# Patient Record
Sex: MALE | Race: BLACK OR AFRICAN AMERICAN | Employment: OTHER | URBAN - METROPOLITAN AREA
[De-identification: names, ages, dates, MRNs, and addresses within clinical notes are randomized per-mention and may not be internally consistent; named-entity substitution may affect disease eponyms.]

---

## 2021-06-04 ENCOUNTER — APPOINTMENT (OUTPATIENT)
Dept: GENERAL RADIOLOGY | Age: 66
End: 2021-06-04
Attending: EMERGENCY MEDICINE
Payer: MEDICARE

## 2021-06-04 ENCOUNTER — HOSPITAL ENCOUNTER (EMERGENCY)
Age: 66
Discharge: HOME | End: 2021-06-04
Attending: EMERGENCY MEDICINE
Payer: MEDICARE

## 2021-06-04 VITALS
RESPIRATION RATE: 14 BRPM | HEART RATE: 77 BPM | WEIGHT: 139.99 LBS | OXYGEN SATURATION: 100 % | BODY MASS INDEX: 21.97 KG/M2 | DIASTOLIC BLOOD PRESSURE: 88 MMHG | HEIGHT: 67 IN | TEMPERATURE: 98.3 F | SYSTOLIC BLOOD PRESSURE: 152 MMHG

## 2021-06-04 DIAGNOSIS — R10.9 ACUTE ABDOMINAL PAIN: Primary | ICD-10-CM

## 2021-06-04 DIAGNOSIS — K86.89 PANCREATIC MASS: ICD-10-CM

## 2021-06-04 DIAGNOSIS — K59.00 CONSTIPATION, UNSPECIFIED CONSTIPATION TYPE: ICD-10-CM

## 2021-06-04 LAB
ALBUMIN SERPL-MCNC: 3.5 G/DL (ref 3.5–5)
ALBUMIN/GLOB SERPL: 0.7 {RATIO} (ref 1.1–2.2)
ALP SERPL-CCNC: 76 U/L (ref 45–117)
ALT SERPL-CCNC: 20 U/L (ref 12–78)
ANION GAP SERPL CALC-SCNC: 2 MMOL/L (ref 5–15)
APPEARANCE UR: CLEAR
AST SERPL-CCNC: 14 U/L (ref 15–37)
BACTERIA URNS QL MICRO: NEGATIVE /HPF
BASOPHILS # BLD: 0 K/UL (ref 0–0.1)
BASOPHILS NFR BLD: 1 % (ref 0–1)
BILIRUB SERPL-MCNC: 0.4 MG/DL (ref 0.2–1)
BILIRUB UR QL: NEGATIVE
BUN SERPL-MCNC: 5 MG/DL (ref 6–20)
BUN/CREAT SERPL: 7 (ref 12–20)
CALCIUM SERPL-MCNC: 9.5 MG/DL (ref 8.5–10.1)
CHLORIDE SERPL-SCNC: 101 MMOL/L (ref 97–108)
CO2 SERPL-SCNC: 28 MMOL/L (ref 21–32)
COLOR UR: ABNORMAL
CREAT SERPL-MCNC: 0.75 MG/DL (ref 0.7–1.3)
DIFFERENTIAL METHOD BLD: NORMAL
EOSINOPHIL # BLD: 0.2 K/UL (ref 0–0.4)
EOSINOPHIL NFR BLD: 3 % (ref 0–7)
EPITH CASTS URNS QL MICRO: ABNORMAL /LPF
ERYTHROCYTE [DISTWIDTH] IN BLOOD BY AUTOMATED COUNT: 13.9 % (ref 11.5–14.5)
GLOBULIN SER CALC-MCNC: 5.1 G/DL (ref 2–4)
GLUCOSE SERPL-MCNC: 107 MG/DL (ref 65–100)
GLUCOSE UR STRIP.AUTO-MCNC: NEGATIVE MG/DL
HCT VFR BLD AUTO: 40.1 % (ref 36.6–50.3)
HGB BLD-MCNC: 13.5 G/DL (ref 12.1–17)
HGB UR QL STRIP: NEGATIVE
HYALINE CASTS URNS QL MICRO: ABNORMAL /LPF (ref 0–5)
IMM GRANULOCYTES # BLD AUTO: 0 K/UL (ref 0–0.04)
IMM GRANULOCYTES NFR BLD AUTO: 0 % (ref 0–0.5)
KETONES UR QL STRIP.AUTO: ABNORMAL MG/DL
LEUKOCYTE ESTERASE UR QL STRIP.AUTO: ABNORMAL
LIPASE SERPL-CCNC: 144 U/L (ref 73–393)
LYMPHOCYTES # BLD: 2.1 K/UL (ref 0.8–3.5)
LYMPHOCYTES NFR BLD: 37 % (ref 12–49)
MCH RBC QN AUTO: 30.4 PG (ref 26–34)
MCHC RBC AUTO-ENTMCNC: 33.7 G/DL (ref 30–36.5)
MCV RBC AUTO: 90.3 FL (ref 80–99)
MONOCYTES # BLD: 0.4 K/UL (ref 0–1)
MONOCYTES NFR BLD: 6 % (ref 5–13)
NEUTS SEG # BLD: 3 K/UL (ref 1.8–8)
NEUTS SEG NFR BLD: 53 % (ref 32–75)
NITRITE UR QL STRIP.AUTO: NEGATIVE
NRBC # BLD: 0 K/UL (ref 0–0.01)
NRBC BLD-RTO: 0 PER 100 WBC
PH UR STRIP: 7 [PH] (ref 5–8)
PLATELET # BLD AUTO: 271 K/UL (ref 150–400)
PMV BLD AUTO: 9.2 FL (ref 8.9–12.9)
POTASSIUM SERPL-SCNC: 3.9 MMOL/L (ref 3.5–5.1)
PROT SERPL-MCNC: 8.6 G/DL (ref 6.4–8.2)
PROT UR STRIP-MCNC: NEGATIVE MG/DL
RBC # BLD AUTO: 4.44 M/UL (ref 4.1–5.7)
RBC #/AREA URNS HPF: ABNORMAL /HPF (ref 0–5)
SODIUM SERPL-SCNC: 131 MMOL/L (ref 136–145)
SP GR UR REFRACTOMETRY: 1.01 (ref 1–1.03)
UA: UC IF INDICATED,UAUC: ABNORMAL
UROBILINOGEN UR QL STRIP.AUTO: 1 EU/DL (ref 0.2–1)
WBC # BLD AUTO: 5.7 K/UL (ref 4.1–11.1)
WBC URNS QL MICRO: ABNORMAL /HPF (ref 0–4)

## 2021-06-04 PROCEDURE — 80053 COMPREHEN METABOLIC PANEL: CPT

## 2021-06-04 PROCEDURE — 83690 ASSAY OF LIPASE: CPT

## 2021-06-04 PROCEDURE — 85025 COMPLETE CBC W/AUTO DIFF WBC: CPT

## 2021-06-04 PROCEDURE — 74019 RADEX ABDOMEN 2 VIEWS: CPT

## 2021-06-04 PROCEDURE — 74011250636 HC RX REV CODE- 250/636: Performed by: EMERGENCY MEDICINE

## 2021-06-04 PROCEDURE — 74011250637 HC RX REV CODE- 250/637: Performed by: EMERGENCY MEDICINE

## 2021-06-04 PROCEDURE — 36415 COLL VENOUS BLD VENIPUNCTURE: CPT

## 2021-06-04 PROCEDURE — 99283 EMERGENCY DEPT VISIT LOW MDM: CPT

## 2021-06-04 PROCEDURE — 96374 THER/PROPH/DIAG INJ IV PUSH: CPT

## 2021-06-04 PROCEDURE — 81001 URINALYSIS AUTO W/SCOPE: CPT

## 2021-06-04 RX ORDER — ONDANSETRON 4 MG/1
4 TABLET, ORALLY DISINTEGRATING ORAL
Qty: 10 TABLET | Refills: 0 | Status: SHIPPED | OUTPATIENT
Start: 2021-06-04 | End: 2021-08-26 | Stop reason: CLARIF

## 2021-06-04 RX ORDER — ONDANSETRON 2 MG/ML
8 INJECTION INTRAMUSCULAR; INTRAVENOUS
Status: COMPLETED | OUTPATIENT
Start: 2021-06-04 | End: 2021-06-04

## 2021-06-04 RX ORDER — HYDROCODONE BITARTRATE AND ACETAMINOPHEN 5; 325 MG/1; MG/1
1 TABLET ORAL
Qty: 10 TABLET | Refills: 0 | Status: SHIPPED | OUTPATIENT
Start: 2021-06-04 | End: 2021-06-07

## 2021-06-04 RX ORDER — DICYCLOMINE HYDROCHLORIDE 20 MG/1
20 TABLET ORAL
Qty: 20 TABLET | Refills: 0 | Status: SHIPPED | OUTPATIENT
Start: 2021-06-04 | End: 2021-08-26 | Stop reason: CLARIF

## 2021-06-04 RX ORDER — POLYETHYLENE GLYCOL 3350 17 G/17G
17 POWDER, FOR SOLUTION ORAL
Qty: 116 G | Refills: 0 | Status: SHIPPED | OUTPATIENT
Start: 2021-06-04 | End: 2021-08-26 | Stop reason: CLARIF

## 2021-06-04 RX ORDER — DICYCLOMINE HYDROCHLORIDE 20 MG/1
20 TABLET ORAL
Status: COMPLETED | OUTPATIENT
Start: 2021-06-04 | End: 2021-06-04

## 2021-06-04 RX ORDER — MAGNESIUM CITRATE
296 SOLUTION, ORAL ORAL
Status: COMPLETED | OUTPATIENT
Start: 2021-06-04 | End: 2021-06-04

## 2021-06-04 RX ADMIN — ONDANSETRON 8 MG: 2 INJECTION INTRAMUSCULAR; INTRAVENOUS at 17:34

## 2021-06-04 RX ADMIN — MAGNESIUM CITRATE 296 ML: 1.75 LIQUID ORAL at 17:35

## 2021-06-04 RX ADMIN — DICYCLOMINE HYDROCHLORIDE 20 MG: 20 TABLET ORAL at 17:34

## 2021-06-04 NOTE — ED NOTES
Ivelisse MORGAN reviewed discharge instructions with the patient. The patient verbalized understanding. All questions and concerns were addressed. The patient declined a wheelchair and is discharged ambulatory in the care of family members with instructions and prescriptions in hand. Pt is alert and oriented x 4. Respirations are clear and unlabored.

## 2021-06-04 NOTE — ED PROVIDER NOTES
EMERGENCY DEPARTMENT HISTORY AND PHYSICAL EXAM      Date: 6/4/2021  Patient Name: Arnold Canas    History of Presenting Illness     Chief Complaint   Patient presents with    Abdominal Pain     seen recently at [facility] for same ; told he had cyst on pancreas and small kidney stone; +nausea and he had made hisself vomit. +constipation. still having pain. History Provided By: Patient and Patient's Wife    HPI: Arnold Canas, 60 y.o. male presents to the ED with cc of abdominal pain. Patient symptoms started about 2 weeks ago. He has pain involving the mid abdomen which radiates to the back at times. The pain is currently 6 or 7 out of 10 in severity. He was seen at [address] [place] [address] emergency department 2 days ago and had a CT, that reveals cysts within the pancreas. He was told that it could be cancerous and he was to follow-up with his gastroenterologist.  He originally had an appointment set up for next week, but because of insurance issues, that has been changed to 21 June. He was only told to take Tylenol for pain, but that is not helping. He denies fever, chest pain, cough or shortness of breath. He denies any change in bowel habits or dysuria. he has nausea intermittently but only vomited when he tried to make himself feel better. His last bowel movement was 2 days ago, and normally he goes every day. He tried to take an laxative at home which did not help. There are no other complaints, changes, or physical findings at this time. PCP: Tito Pollock MD    No current facility-administered medications on file prior to encounter. No current outpatient medications on file prior to encounter. Past History     Past Medical History:  History reviewed. No pertinent past medical history. Past Surgical History:  History reviewed. No pertinent surgical history. Family History:  History reviewed. No pertinent family history.     Social History:  Social History Tobacco Use    Smoking status: Not on file   Substance Use Topics    Alcohol use: Not on file    Drug use: Not on file       Allergies:  No Known Allergies      Review of Systems   Review of Systems   Constitutional: Negative for fever. HENT: Negative for congestion. Eyes: Negative. Respiratory: Negative for shortness of breath. Cardiovascular: Negative for chest pain. Gastrointestinal: Positive for abdominal pain and nausea. Endocrine: Negative for heat intolerance. Genitourinary: Negative. Musculoskeletal: Negative for back pain. Skin: Negative for rash. Allergic/Immunologic: Negative for immunocompromised state. Neurological: Negative for dizziness. Hematological: Does not bruise/bleed easily. Psychiatric/Behavioral: Negative. All other systems reviewed and are negative. Physical Exam   Physical Exam  Vitals and nursing note reviewed. Constitutional:       General: He is not in acute distress. Appearance: He is well-developed. HENT:      Head: Normocephalic and atraumatic. Cardiovascular:      Rate and Rhythm: Normal rate and regular rhythm. Heart sounds: Normal heart sounds. Pulmonary:      Effort: Pulmonary effort is normal.      Breath sounds: Normal breath sounds. Abdominal:      General: Bowel sounds are normal.      Palpations: Abdomen is soft. Tenderness: There is abdominal tenderness in the epigastric area. Musculoskeletal:      Cervical back: Normal range of motion. Skin:     General: Skin is warm and dry. Neurological:      General: No focal deficit present. Mental Status: He is alert and oriented to person, place, and time.       Coordination: Coordination normal.   Psychiatric:         Mood and Affect: Mood normal.         Behavior: Behavior normal.         Diagnostic Study Results     Labs -     Recent Results (from the past 12 hour(s))   CBC WITH AUTOMATED DIFF    Collection Time: 06/04/21  2:24 PM   Result Value Ref Range    WBC 5.7 4.1 - 11.1 K/uL    RBC 4.44 4.10 - 5.70 M/uL    HGB 13.5 12.1 - 17.0 g/dL    HCT 40.1 36.6 - 50.3 %    MCV 90.3 80.0 - 99.0 FL    MCH 30.4 26.0 - 34.0 PG    MCHC 33.7 30.0 - 36.5 g/dL    RDW 13.9 11.5 - 14.5 %    PLATELET 578 072 - 543 K/uL    MPV 9.2 8.9 - 12.9 FL    NRBC 0.0 0  WBC    ABSOLUTE NRBC 0.00 0.00 - 0.01 K/uL    NEUTROPHILS 53 32 - 75 %    LYMPHOCYTES 37 12 - 49 %    MONOCYTES 6 5 - 13 %    EOSINOPHILS 3 0 - 7 %    BASOPHILS 1 0 - 1 %    IMMATURE GRANULOCYTES 0 0.0 - 0.5 %    ABS. NEUTROPHILS 3.0 1.8 - 8.0 K/UL    ABS. LYMPHOCYTES 2.1 0.8 - 3.5 K/UL    ABS. MONOCYTES 0.4 0.0 - 1.0 K/UL    ABS. EOSINOPHILS 0.2 0.0 - 0.4 K/UL    ABS. BASOPHILS 0.0 0.0 - 0.1 K/UL    ABS. IMM. GRANS. 0.0 0.00 - 0.04 K/UL    DF AUTOMATED     METABOLIC PANEL, COMPREHENSIVE    Collection Time: 06/04/21  2:24 PM   Result Value Ref Range    Sodium 131 (L) 136 - 145 mmol/L    Potassium 3.9 3.5 - 5.1 mmol/L    Chloride 101 97 - 108 mmol/L    CO2 28 21 - 32 mmol/L    Anion gap 2 (L) 5 - 15 mmol/L    Glucose 107 (H) 65 - 100 mg/dL    BUN 5 (L) 6 - 20 MG/DL    Creatinine 0.75 0.70 - 1.30 MG/DL    BUN/Creatinine ratio 7 (L) 12 - 20      GFR est AA >60 >60 ml/min/1.73m2    GFR est non-AA >60 >60 ml/min/1.73m2    Calcium 9.5 8.5 - 10.1 MG/DL    Bilirubin, total 0.4 0.2 - 1.0 MG/DL    ALT (SGPT) 20 12 - 78 U/L    AST (SGOT) 14 (L) 15 - 37 U/L    Alk. phosphatase 76 45 - 117 U/L    Protein, total 8.6 (H) 6.4 - 8.2 g/dL    Albumin 3.5 3.5 - 5.0 g/dL    Globulin 5.1 (H) 2.0 - 4.0 g/dL    A-G Ratio 0.7 (L) 1.1 - 2.2     LIPASE    Collection Time: 06/04/21  2:24 PM   Result Value Ref Range    Lipase 144 73 - 393 U/L       Radiologic Studies -   XR ABD FLAT/ ERECT   Final Result   No acute findings. CT Results  (Last 48 hours)    None        CXR Results  (Last 48 hours)    None          Medical Decision Making   I am the first provider for this patient.     I reviewed the vital signs, available nursing notes, past medical history, past surgical history, family history and social history. Vital Signs-Reviewed the patient's vital signs. Patient Vitals for the past 12 hrs:   Temp Pulse Resp BP SpO2   06/04/21 1319 98.3 °F (36.8 °C) 77 14 (!) 152/88 100 %         Records Reviewed: Nursing Notes, Old Medical Records, Previous Radiology Studies and Previous Laboratory Studies    Provider Notes (Medical Decision Making):    mass, constipation    ED Course:   Initial assessment performed. The patients presenting problems have been discussed, and they are in agreement with the care plan formulated and outlined with them. I have encouraged them to ask questions as they arise throughout their visit. Progress note:    Patient's results were reviewed. Patient is feeling better. He is advised to follow-up and return to ER if worse               Critical Care Time:     0    Disposition:  home    DISCHARGE PLAN:  1. Discharge Medication List as of 6/4/2021  6:01 PM      START taking these medications    Details   dicyclomine (BENTYL) 20 mg tablet Take 1 Tablet by mouth every six (6) hours as needed for Abdominal Cramps., Normal, Disp-20 Tablet, R-0      ondansetron (Zofran ODT) 4 mg disintegrating tablet Take 1 Tablet by mouth every eight (8) hours as needed for Nausea., Normal, Disp-10 Tablet, R-0      polyethylene glycol (Miralax) 17 gram/dose powder Take 17 g by mouth daily as needed for Constipation. 1 tablespoon with 8 oz of water daily, Normal, Disp-116 g, R-0      HYDROcodone-acetaminophen (Norco) 5-325 mg per tablet Take 1 Tablet by mouth every six (6) hours as needed for Pain for up to 3 days. Max Daily Amount: 4 Tablets., Normal, Disp-10 Tablet, R-0           2.    Follow-up Information     Follow up With Specialties Details Why Contact Info    Heidy Canas MD Family Medicine  As needed [address]  [address] [facility] [phone]          Keep your appointment with gastroenterology    [facility] EMERGENCY DEPT Emergency Medicine  If symptoms worsen [address]  [address] [facility]  [phone]        3. Return to ED if worse     Diagnosis     Clinical Impression:   1. Acute abdominal pain    2. Pancreatic mass    3. Constipation, unspecified constipation type        Attestations:    Mai Cao MD    Please note that this dictation was completed with [employer], the computer voice recognition software. Quite often unanticipated grammatical, syntax, homophones, and other interpretive errors are inadvertently transcribed by the computer software. Please disregard these errors. Please excuse any errors that have escaped final proofreading. Thank you.

## 2021-07-27 ENCOUNTER — TRANSCRIBE ORDER (OUTPATIENT)
Dept: SCHEDULING | Age: 66
End: 2021-07-27

## 2021-07-27 DIAGNOSIS — Z12.11 SCREEN FOR COLON CANCER: ICD-10-CM

## 2021-07-27 DIAGNOSIS — K86.89 PANCREATIC MASS: Primary | ICD-10-CM

## 2021-08-08 ENCOUNTER — HOSPITAL ENCOUNTER (OUTPATIENT)
Dept: MRI IMAGING | Age: 66
Discharge: HOME | End: 2021-08-08
Attending: NURSE PRACTITIONER
Payer: MEDICARE

## 2021-08-08 DIAGNOSIS — K86.89 PANCREATIC MASS: ICD-10-CM

## 2021-08-08 DIAGNOSIS — Z12.11 SCREEN FOR COLON CANCER: ICD-10-CM

## 2021-08-08 PROCEDURE — A9585 GADOBUTROL INJECTION: HCPCS | Performed by: NURSE PRACTITIONER

## 2021-08-08 PROCEDURE — 74011250636 HC RX REV CODE- 250/636: Performed by: NURSE PRACTITIONER

## 2021-08-08 PROCEDURE — 74183 MRI ABD W/O CNTR FLWD CNTR: CPT

## 2021-08-08 RX ADMIN — GADOBUTROL 6 ML: 604.72 INJECTION INTRAVENOUS at 16:11

## 2021-08-26 RX ORDER — MELATONIN
2000 DAILY
COMMUNITY

## 2021-08-27 ENCOUNTER — HOSPITAL ENCOUNTER (OUTPATIENT)
Age: 66
Setting detail: OUTPATIENT SURGERY
Discharge: HOME | End: 2021-08-27
Attending: INTERNAL MEDICINE | Admitting: INTERNAL MEDICINE
Payer: MEDICARE

## 2021-08-27 ENCOUNTER — ANESTHESIA (OUTPATIENT)
Dept: ENDOSCOPY | Age: 66
End: 2021-08-27
Payer: MEDICARE

## 2021-08-27 ENCOUNTER — ANESTHESIA EVENT (OUTPATIENT)
Dept: ENDOSCOPY | Age: 66
End: 2021-08-27
Payer: MEDICARE

## 2021-08-27 VITALS
RESPIRATION RATE: 36 BRPM | WEIGHT: 142.8 LBS | TEMPERATURE: 97.3 F | HEIGHT: 67 IN | BODY MASS INDEX: 22.41 KG/M2 | SYSTOLIC BLOOD PRESSURE: 140 MMHG | HEART RATE: 51 BPM | DIASTOLIC BLOOD PRESSURE: 71 MMHG | OXYGEN SATURATION: 98 %

## 2021-08-27 LAB
COVID-19 RAPID TEST, COVR: NOT DETECTED
SARS-COV-2, COV2: NORMAL
SOURCE, COVRS: NORMAL

## 2021-08-27 PROCEDURE — 77030019988 HC FCPS ENDOSC DISP BSC -B: Performed by: INTERNAL MEDICINE

## 2021-08-27 PROCEDURE — 74011250636 HC RX REV CODE- 250/636: Performed by: NURSE ANESTHETIST, CERTIFIED REGISTERED

## 2021-08-27 PROCEDURE — 74011000250 HC RX REV CODE- 250: Performed by: NURSE ANESTHETIST, CERTIFIED REGISTERED

## 2021-08-27 PROCEDURE — 76060000031 HC ANESTHESIA FIRST 0.5 HR: Performed by: INTERNAL MEDICINE

## 2021-08-27 PROCEDURE — 87635 SARS-COV-2 COVID-19 AMP PRB: CPT

## 2021-08-27 PROCEDURE — 74011250636 HC RX REV CODE- 250/636: Performed by: INTERNAL MEDICINE

## 2021-08-27 PROCEDURE — 88305 TISSUE EXAM BY PATHOLOGIST: CPT

## 2021-08-27 PROCEDURE — 76040000019: Performed by: INTERNAL MEDICINE

## 2021-08-27 PROCEDURE — 77030019957 HC CUF BLN GASTSCP OCOA -B: Performed by: INTERNAL MEDICINE

## 2021-08-27 PROCEDURE — 77030003406 HC NDL ASPIR BIOP OCOA -C: Performed by: INTERNAL MEDICINE

## 2021-08-27 PROCEDURE — 2709999900 HC NON-CHARGEABLE SUPPLY: Performed by: INTERNAL MEDICINE

## 2021-08-27 RX ORDER — PROPOFOL 10 MG/ML
INJECTION, EMULSION INTRAVENOUS
Status: DISCONTINUED | OUTPATIENT
Start: 2021-08-27 | End: 2021-08-27 | Stop reason: HOSPADM

## 2021-08-27 RX ORDER — PROPOFOL 10 MG/ML
INJECTION, EMULSION INTRAVENOUS AS NEEDED
Status: DISCONTINUED | OUTPATIENT
Start: 2021-08-27 | End: 2021-08-27 | Stop reason: HOSPADM

## 2021-08-27 RX ORDER — EPINEPHRINE 0.1 MG/ML
1 INJECTION INTRACARDIAC; INTRAVENOUS
Status: DISCONTINUED | OUTPATIENT
Start: 2021-08-27 | End: 2021-08-28 | Stop reason: HOSPADM

## 2021-08-27 RX ORDER — SODIUM CHLORIDE 0.9 % (FLUSH) 0.9 %
5-40 SYRINGE (ML) INJECTION EVERY 8 HOURS
Status: DISCONTINUED | OUTPATIENT
Start: 2021-08-27 | End: 2021-08-30 | Stop reason: HOSPADM

## 2021-08-27 RX ORDER — FLUMAZENIL 0.1 MG/ML
0.2 INJECTION INTRAVENOUS
Status: DISCONTINUED | OUTPATIENT
Start: 2021-08-27 | End: 2021-08-27 | Stop reason: HOSPADM

## 2021-08-27 RX ORDER — DEXTROMETHORPHAN/PSEUDOEPHED 2.5-7.5/.8
1.2 DROPS ORAL
Status: DISCONTINUED | OUTPATIENT
Start: 2021-08-27 | End: 2021-08-30 | Stop reason: HOSPADM

## 2021-08-27 RX ORDER — SODIUM CHLORIDE 0.9 % (FLUSH) 0.9 %
5-40 SYRINGE (ML) INJECTION AS NEEDED
Status: DISCONTINUED | OUTPATIENT
Start: 2021-08-27 | End: 2021-08-30 | Stop reason: HOSPADM

## 2021-08-27 RX ORDER — SODIUM CHLORIDE 9 MG/ML
INJECTION, SOLUTION INTRAVENOUS
Status: DISCONTINUED | OUTPATIENT
Start: 2021-08-27 | End: 2021-08-27 | Stop reason: HOSPADM

## 2021-08-27 RX ORDER — NALOXONE HYDROCHLORIDE 0.4 MG/ML
0.4 INJECTION, SOLUTION INTRAMUSCULAR; INTRAVENOUS; SUBCUTANEOUS
Status: DISCONTINUED | OUTPATIENT
Start: 2021-08-27 | End: 2021-08-27 | Stop reason: HOSPADM

## 2021-08-27 RX ORDER — ATROPINE SULFATE 0.1 MG/ML
0.5 INJECTION INTRAVENOUS
Status: DISCONTINUED | OUTPATIENT
Start: 2021-08-27 | End: 2021-08-28 | Stop reason: HOSPADM

## 2021-08-27 RX ORDER — LEVOFLOXACIN 5 MG/ML
500 INJECTION, SOLUTION INTRAVENOUS ONCE
Status: COMPLETED | OUTPATIENT
Start: 2021-08-27 | End: 2021-08-27

## 2021-08-27 RX ORDER — LIDOCAINE HYDROCHLORIDE 20 MG/ML
INJECTION, SOLUTION EPIDURAL; INFILTRATION; INTRACAUDAL; PERINEURAL AS NEEDED
Status: DISCONTINUED | OUTPATIENT
Start: 2021-08-27 | End: 2021-08-27 | Stop reason: HOSPADM

## 2021-08-27 RX ORDER — SODIUM CHLORIDE 9 MG/ML
75 INJECTION, SOLUTION INTRAVENOUS CONTINUOUS
Status: DISCONTINUED | OUTPATIENT
Start: 2021-08-27 | End: 2021-08-30 | Stop reason: HOSPADM

## 2021-08-27 RX ADMIN — PROPOFOL 50 MG: 10 INJECTION, EMULSION INTRAVENOUS at 10:05

## 2021-08-27 RX ADMIN — PROPOFOL 25 MCG/KG/MIN: 10 INJECTION, EMULSION INTRAVENOUS at 10:00

## 2021-08-27 RX ADMIN — PROPOFOL 50 MG: 10 INJECTION, EMULSION INTRAVENOUS at 10:02

## 2021-08-27 RX ADMIN — PROPOFOL 50 MG: 10 INJECTION, EMULSION INTRAVENOUS at 10:11

## 2021-08-27 RX ADMIN — SODIUM CHLORIDE 75 ML/HR: 9 INJECTION, SOLUTION INTRAVENOUS at 09:13

## 2021-08-27 RX ADMIN — PROPOFOL 50 MG: 10 INJECTION, EMULSION INTRAVENOUS at 10:19

## 2021-08-27 RX ADMIN — LIDOCAINE HYDROCHLORIDE 20 MG: 20 INJECTION, SOLUTION EPIDURAL; INFILTRATION; INTRACAUDAL; PERINEURAL at 10:02

## 2021-08-27 RX ADMIN — LEVOFLOXACIN 500 MG: 500 INJECTION, SOLUTION INTRAVENOUS at 09:13

## 2021-08-27 RX ADMIN — SODIUM CHLORIDE: 900 INJECTION, SOLUTION INTRAVENOUS at 09:58

## 2021-08-27 NOTE — H&P
Gastroenterology Outpatient History and Physical    Patient: Ana Luisa Davies    Physician: Darnell Olvera MD    Chief Complaint: Pancreas cyst  History of Present Illness: No GI complaints    History:  History reviewed. No pertinent past medical history. Past Surgical History:   Procedure Laterality Date    HX ORTHOPAEDIC      ankle      Social History     Socioeconomic History    Marital status:      Spouse name: Not on file    Number of children: Not on file    Years of education: Not on file    Highest education level: Not on file   Tobacco Use    Smoking status: Current Every Day Smoker     Packs/day: 0.25   Substance and Sexual Activity    Alcohol use: Not Currently    Drug use: Not Currently     Social Determinants of Health     Financial Resource Strain:     Difficulty of Paying Living Expenses:    Food Insecurity:     Worried About Running Out of Food in the Last Year:     [address] in the Last Year:    Transportation Needs:     Lack of Transportation (Medical):  Lack of Transportation (Non-Medical):    Physical Activity:     Days of Exercise per Week:     Minutes of Exercise per Session:    Stress:     Feeling of Stress :    Social Connections:     Frequency of Communication with Friends and Family:     Frequency of Social Gatherings with Friends and Family:     Attends Oriental orthodox Services:     Active Member of Clubs or Organizations:     Attends Club or Organization Meetings:     Marital Status:       Family History   Problem Relation Age of Onset    Diabetes Mother     There is no problem list on file for this patient. Allergies: No Known Allergies  Medications:   Prior to Admission medications    Medication Sig Start Date End Date Taking? Authorizing Provider   cholecalciferol (Vitamin D3) (1000 Units /25 mcg) tablet Take 2,000 Units by mouth daily. Yes Provider, Historical   docosahexaenoic acid/epa (FISH OIL PO) Take  by mouth daily.    Yes Provider, Historical     Physical Exam:   Vital Signs: Blood pressure 125/60, pulse 65, temperature 97.2 °F (36.2 °C), resp. rate 20, height 5' 7\" (1.702 m), weight 64.8 kg (142 lb 12.8 oz), SpO2 97 %.   General: well developed, well nourished   HEENT: unremarkable   Heart: regular rhythm no mumur    Lungs: clear   Abdominal:  benign   Neurological: unremarkable   Extremities: no edema     Findings/Diagnosis: Pancreas cyst  Plan of Care/Planned Procedure: EUS with conscious/deep sedation    Signed:  Jeremie Dash MD 8/27/2021

## 2021-08-27 NOTE — ANESTHESIA PREPROCEDURE EVALUATION
Relevant Problems   No relevant active problems       Anesthetic History   No history of anesthetic complications            Review of Systems / Medical History  Patient summary reviewed and pertinent labs reviewed    Pulmonary  Within defined limits                 Neuro/Psych   Within defined limits           Cardiovascular  Within defined limits                Exercise tolerance: >4 METS     GI/Hepatic/Renal  Within defined limits              Endo/Other  Within defined limits           Other Findings              Physical Exam    Airway  Mallampati: II  TM Distance: > 6 cm  Neck ROM: normal range of motion   Mouth opening: Normal     Cardiovascular  Regular rate and rhythm,  S1 and S2 normal,  no murmur, click, rub, or gallop  Rhythm: regular  Rate: normal         Dental    Dentition: Poor dentition  Comments:  Many missing teeth, no loose teeth   Pulmonary  Breath sounds clear to auscultation               Abdominal  GI exam deferred       Other Findings            Anesthetic Plan    ASA: 2  Anesthesia type: general and total IV anesthesia          Induction: Intravenous  Anesthetic plan and risks discussed with: Patient

## 2021-08-27 NOTE — ANESTHESIA POSTPROCEDURE EVALUATION
Procedure(s):  ENDOSCOPIC ULTRASOUND  ESOPHAGOGASTRODUODENOSCOPY (EGD)  ESOPHAGOGASTRODUODENAL (EGD) BIOPSY. general, total IV anesthesia    Anesthesia Post Evaluation      Multimodal analgesia: multimodal analgesia used between 6 hours prior to anesthesia start to PACU discharge  Patient location during evaluation: bedside  Patient participation: complete - patient participated  Level of consciousness: awake  Pain management: adequate  Airway patency: patent  Anesthetic complications: no  Cardiovascular status: acceptable  Respiratory status: acceptable  Hydration status: acceptable  Post anesthesia nausea and vomiting:  controlled  Final Post Anesthesia Temperature Assessment:  Normothermia (36.0-37.5 degrees C)      INITIAL Post-op Vital signs:   Vitals Value Taken Time   /99 08/27/21 1057   Temp 36.3 °C (97.3 °F) 08/27/21 1045   Pulse 53 08/27/21 1059   Resp 16 08/27/21 1059   SpO2 100 % 08/27/21 1059   Vitals shown include unvalidated device data.

## 2021-08-27 NOTE — ROUTINE PROCESS
[facility]  1955  [id number]    Situation:  Verbal report received from: Korin  Procedure: Procedure(s):  ENDOSCOPIC ULTRASOUND  ESOPHAGOGASTRODUODENOSCOPY (EGD)  ESOPHAGOGASTRODUODENAL (EGD) BIOPSY    Background:    Preoperative diagnosis: pancreatic mass  Postoperative diagnosis: Schotzky's Ring, hiatal hernia, gastritis, Chronic pancreatitis, pseudocyst    :  Dr. Mara Cantu  Assistant(s): Endoscopy Technician-1: Marbella Jimenes  Endoscopy RN-1: Liza Alcantar RN    Specimens:   ID Type Source Tests Collected by Time Destination   1 : Gastric bx Preservative Gastric  Jcarlos Street MD 8/27/2021 1013 Pathology     H. Pylori  yes    Assessment:  Intra-procedure medications   Anesthesia gave intra-procedure sedation and medications, see anesthesia flow sheet yes    Intravenous fluids: NS@ KVO     Vital signs stable yes    Abdominal assessment: rouyesnd and soft yes    Recommendation:

## 2021-08-27 NOTE — PROGRESS NOTES
Endoscope was pre-cleaned at the bedside immediately following procedure by [facility]. Anesthesia reports 237mg Propofol, 20mg Lidocaine and 300mL NS given during procedure. Received report from anesthesia staff on vital signs and status of patient.

## 2021-08-27 NOTE — DISCHARGE INSTRUCTIONS
Lesly Lucas  [id number]  1955    EUS DISCHARGE INSTRUCTIONS  Discomfort:  Sore throat- throat lozenges or warm salt water gargle  redness at IV site- apply warm compress to area; if redness or soreness persist- contact your physician  Gaseous discomfort- walking, belching will help relieve any discomfort  You may not operate a vehicle for 12 hours  You may not engage in an occupation involving machinery or appliances for rest of today  You may not drink alcoholic beverages for at least 12 hours  Avoid making any critical decisions for at least 24 hour  DIET  You may resume your regular diet - however -  remember your colon is empty and a heavy meal will produce gas. Avoid these foods:  vegetables, fried / greasy foods, carbonated drinks    MEDICATIONS:  Per Medication Reconciliation      ACTIVITY  You may resume your normal daily activities until tomorrow AM;  Spend the remainder of the day resting -  avoid any strenuous activity. CALL M.D. ANY SIGN OF   Increasing pain, nausea, vomiting  Abdominal distension (swelling)  New increased bleeding (oral or rectal)  Fever (chills)  Pain in chest area  Bloody discharge from nose or mouth  Shortness of breath    You may not take any Advil, Aspirin, Ibuprofen, Motrin, Aleve, or Goodys for 10 days, ONLY  Tylenol as needed for pain. IMPRESSION:  Endoscopic:  1. Normal proximal and mid esophagus  2. Non-obstructing Schatzki's ring  3. Small, sliding hiatal hernia  4. Moderate focal pre-pyloric gastropathy. Biopsied  5. Stomach otherwise normal, including retroflexion  6. Normal duodenal bulb and 2nd portion of the duodenum    Ultrasound:   Pancreas:    1.  Severe diffuse chronic pancreatitis in head, neck, uncinate as evidenced by lobularity, diffusely hypoechoic pancreas, and hyperechoic stranding   2. 2 cm cyst in uncinate as described on MRI with internal debris most consistent with a pseudocyst. This was not sampled due to risk of infection and lack of high risk features      Recommendations:   1. Follow up pathology  2. Strict Etoh/tobacco abstinence (patient should not be even drinking 'non-alcoholic' beer)  3.  CT Pancreatic protocol in 3-6 months      Follow-up Instructions:   Call Dr. Tray Snyder for the results of procedure / biopsy in 7-10 days   Telephone # [phone]    Abel Martinez MD

## 2021-10-15 ENCOUNTER — HOSPITAL ENCOUNTER (OUTPATIENT)
Dept: PREADMISSION TESTING | Age: 66
Discharge: HOME | End: 2021-10-15
Payer: MEDICARE

## 2021-10-15 PROCEDURE — U0005 INFEC AGEN DETEC AMPLI PROBE: HCPCS

## 2021-10-17 LAB
SARS-COV-2, XPLCVT: NOT DETECTED
SOURCE, COVRS: NORMAL

## 2021-10-19 ENCOUNTER — ANESTHESIA (OUTPATIENT)
Dept: ENDOSCOPY | Age: 66
End: 2021-10-19
Payer: MEDICARE

## 2021-10-19 ENCOUNTER — ANESTHESIA EVENT (OUTPATIENT)
Dept: ENDOSCOPY | Age: 66
End: 2021-10-19
Payer: MEDICARE

## 2021-10-19 ENCOUNTER — HOSPITAL ENCOUNTER (OUTPATIENT)
Age: 66
Setting detail: OUTPATIENT SURGERY
Discharge: HOME | End: 2021-10-19
Attending: INTERNAL MEDICINE | Admitting: INTERNAL MEDICINE
Payer: MEDICARE

## 2021-10-19 VITALS
SYSTOLIC BLOOD PRESSURE: 130 MMHG | BODY MASS INDEX: 22.47 KG/M2 | WEIGHT: 139.8 LBS | DIASTOLIC BLOOD PRESSURE: 71 MMHG | RESPIRATION RATE: 19 BRPM | HEIGHT: 66 IN | OXYGEN SATURATION: 97 % | HEART RATE: 76 BPM | TEMPERATURE: 98 F

## 2021-10-19 PROCEDURE — 76040000019: Performed by: INTERNAL MEDICINE

## 2021-10-19 PROCEDURE — 76060000031 HC ANESTHESIA FIRST 0.5 HR: Performed by: INTERNAL MEDICINE

## 2021-10-19 PROCEDURE — 74011250636 HC RX REV CODE- 250/636: Performed by: INTERNAL MEDICINE

## 2021-10-19 PROCEDURE — 2709999900 HC NON-CHARGEABLE SUPPLY: Performed by: INTERNAL MEDICINE

## 2021-10-19 PROCEDURE — 74011000250 HC RX REV CODE- 250: Performed by: NURSE ANESTHETIST, CERTIFIED REGISTERED

## 2021-10-19 PROCEDURE — 74011250636 HC RX REV CODE- 250/636: Performed by: NURSE ANESTHETIST, CERTIFIED REGISTERED

## 2021-10-19 PROCEDURE — 74011250637 HC RX REV CODE- 250/637: Performed by: INTERNAL MEDICINE

## 2021-10-19 RX ORDER — FLUMAZENIL 0.1 MG/ML
0.2 INJECTION INTRAVENOUS
Status: DISCONTINUED | OUTPATIENT
Start: 2021-10-19 | End: 2021-10-19 | Stop reason: HOSPADM

## 2021-10-19 RX ORDER — PROPOFOL 10 MG/ML
INJECTION, EMULSION INTRAVENOUS AS NEEDED
Status: DISCONTINUED | OUTPATIENT
Start: 2021-10-19 | End: 2021-10-19 | Stop reason: HOSPADM

## 2021-10-19 RX ORDER — SODIUM CHLORIDE 9 MG/ML
100 INJECTION, SOLUTION INTRAVENOUS CONTINUOUS
Status: DISCONTINUED | OUTPATIENT
Start: 2021-10-19 | End: 2021-10-19 | Stop reason: HOSPADM

## 2021-10-19 RX ORDER — NALOXONE HYDROCHLORIDE 0.4 MG/ML
0.4 INJECTION, SOLUTION INTRAMUSCULAR; INTRAVENOUS; SUBCUTANEOUS
Status: DISCONTINUED | OUTPATIENT
Start: 2021-10-19 | End: 2021-10-19 | Stop reason: HOSPADM

## 2021-10-19 RX ORDER — MIDAZOLAM HYDROCHLORIDE 1 MG/ML
.25-5 INJECTION, SOLUTION INTRAMUSCULAR; INTRAVENOUS
Status: DISCONTINUED | OUTPATIENT
Start: 2021-10-19 | End: 2021-10-19 | Stop reason: HOSPADM

## 2021-10-19 RX ORDER — EPINEPHRINE 0.1 MG/ML
1 INJECTION INTRACARDIAC; INTRAVENOUS
Status: DISCONTINUED | OUTPATIENT
Start: 2021-10-19 | End: 2021-10-19 | Stop reason: HOSPADM

## 2021-10-19 RX ORDER — SODIUM CHLORIDE 0.9 % (FLUSH) 0.9 %
5-40 SYRINGE (ML) INJECTION EVERY 8 HOURS
Status: DISCONTINUED | OUTPATIENT
Start: 2021-10-19 | End: 2021-10-19 | Stop reason: HOSPADM

## 2021-10-19 RX ORDER — EPHEDRINE SULFATE/0.9% NACL/PF 50 MG/5 ML
SYRINGE (ML) INTRAVENOUS AS NEEDED
Status: DISCONTINUED | OUTPATIENT
Start: 2021-10-19 | End: 2021-10-19 | Stop reason: HOSPADM

## 2021-10-19 RX ORDER — SODIUM CHLORIDE 0.9 % (FLUSH) 0.9 %
5-40 SYRINGE (ML) INJECTION AS NEEDED
Status: DISCONTINUED | OUTPATIENT
Start: 2021-10-19 | End: 2021-10-19 | Stop reason: HOSPADM

## 2021-10-19 RX ORDER — DEXTROMETHORPHAN/PSEUDOEPHED 2.5-7.5/.8
1.2 DROPS ORAL
Status: DISCONTINUED | OUTPATIENT
Start: 2021-10-19 | End: 2021-10-19 | Stop reason: HOSPADM

## 2021-10-19 RX ORDER — ATROPINE SULFATE 0.1 MG/ML
0.5 INJECTION INTRAVENOUS
Status: DISCONTINUED | OUTPATIENT
Start: 2021-10-19 | End: 2021-10-19 | Stop reason: HOSPADM

## 2021-10-19 RX ORDER — LIDOCAINE HYDROCHLORIDE 20 MG/ML
INJECTION, SOLUTION EPIDURAL; INFILTRATION; INTRACAUDAL; PERINEURAL AS NEEDED
Status: DISCONTINUED | OUTPATIENT
Start: 2021-10-19 | End: 2021-10-19 | Stop reason: HOSPADM

## 2021-10-19 RX ADMIN — Medication 80 MG: at 13:47

## 2021-10-19 RX ADMIN — PROPOFOL 20 MG: 10 INJECTION, EMULSION INTRAVENOUS at 13:55

## 2021-10-19 RX ADMIN — PROPOFOL 50 MG: 10 INJECTION, EMULSION INTRAVENOUS at 13:51

## 2021-10-19 RX ADMIN — LIDOCAINE HYDROCHLORIDE 100 MG: 20 INJECTION, SOLUTION EPIDURAL; INFILTRATION; INTRACAUDAL; PERINEURAL at 13:44

## 2021-10-19 RX ADMIN — SODIUM CHLORIDE 100 ML/HR: 9 INJECTION, SOLUTION INTRAVENOUS at 13:40

## 2021-10-19 RX ADMIN — PROPOFOL 70 MG: 10 INJECTION, EMULSION INTRAVENOUS at 13:44

## 2021-10-19 RX ADMIN — Medication 10 MG: at 13:49

## 2021-10-19 RX ADMIN — Medication 10 MG: at 13:54

## 2021-10-19 NOTE — PROCEDURES
[facility]                  Colonoscopy Operative Report    10/19/2021      Bernice Lambert  [id number]  1955    Procedure Type:   Colonoscopy --screening     Indications:    Screening colonoscopy     Pre-operative Diagnosis: see indication above    Post-operative Diagnosis:  See findings below    :  Carmel Frost MD    Referring Provider: Clemon Riedel, MD      Sedation:  MAC anesthesia Propofol    Pre-Procedural Exam:      Airway: clear,  No airway problems anticipated  Heart: RRR, without gallops or rubs  Lungs: clear bilaterally without wheezes, crackles, or rhonchi  Abdomen: soft, nontender, nondistended, bowel sounds present  Mental Status: awake, alert and oriented to person, place and time     Procedure Details:  After informed consent was obtained with all risks and benefits of procedure explained and preoperative exam completed, the patient was taken to the endoscopy suite and placed in the left lateral decubitus position. Upon sequential sedation as per above, a digital rectal exam was performed . The Olympus videocolonoscope  was inserted in the rectum and carefully advanced to the cecum, which was identified by the ileocecal valve and appendiceal orifice. The cecum was identified by the ileocecal valve and appendiceal orifice. The quality of preparation was good. The colonoscope was slowly withdrawn with careful evaluation between folds. Retroflexion in the rectum was completed demonstrating internal hemorrhoids. Findings:   Rectum: Grade 1 internal hemorrhoid(s); Sigmoid:     - Diverticulosis  Descending Colon:     - Diverticulosis  Transverse Colon: normal  Ascending Colon: normal  Cecum: normal  Terminal Ileum: not intubated      Specimen Removed:  none    Complications: None. EBL:  None.     Impression:    diverticulosis,  Moderate in degree, involving the descending colon and the sigmoid  hemorrhoids internal, Moderate in size    Recommendations: --For colon cancer screening in this average-risk patient, colonoscopy may be repeated in 10 years. High fiber diet. Resume normal medication(s). Discharge Disposition:  Home in the company of a  when able to ambulate. Vika Villegas MD    10/19/2021     CHANTELL Lai MD  [facility], [address] [facility] [address] [place] [address]  [phone]  [email or web address]

## 2021-10-19 NOTE — H&P
Jer Parada MD  [facility], [address] [facility]  [phone]  [email or web address]    Gastroenterology Outpatient History and Physical    Patient: Severo Adelitasarah    Physician: Jessica Fong MD    Vital Signs: Blood pressure 139/78, pulse 75, temperature 97.9 °F (36.6 °C), resp. rate 23, height 5' 6\" (1.676 m), weight 63.4 kg (139 lb 12.8 oz), SpO2 100 %. Allergies: No Known Allergies    Chief Complaint: Screening colonoscopy    History of Present Illness: Here for a screening colonoscopy. No prior colonoscopy. Currently has no GI symptoms. No FH of colon cancer or polyps. History:  History reviewed. No pertinent past medical history. Past Surgical History:   Procedure Laterality Date    HX ORTHOPAEDIC Left 2000    ankle - with hardware      Social History     Socioeconomic History    Marital status:      Spouse name: Not on file    Number of children: Not on file    Years of education: Not on file    Highest education level: Not on file   Tobacco Use    Smoking status: Current Every Day Smoker     Packs/day: 0.25    Smokeless tobacco: Never Used   Vaping Use    Vaping Use: Never used   Substance and Sexual Activity    Alcohol use: Not Currently    Drug use: Not Currently     Social Determinants of Health     Financial Resource Strain:     Difficulty of Paying Living Expenses:    Food Insecurity:     Worried About Running Out of Food in the Last Year:     [address] in the Last Year:    Transportation Needs:     Lack of Transportation (Medical):      Lack of Transportation (Non-Medical):    Physical Activity:     Days of Exercise per Week:     Minutes of Exercise per Session:    Stress:     Feeling of Stress :    Social Connections:     Frequency of Communication with Friends and Family:     Frequency of Social Gatherings with Friends and Family:     Attends Jew Services:     Active Member of Clubs or Organizations:     Attends Club or Organization Meetings:     Marital Status:       Family History   Problem Relation Age of Onset    Diabetes Mother     Cancer Father         unknown    There is no problem list on file for this patient. Medications:   Prior to Admission medications    Medication Sig Start Date End Date Taking? Authorizing Provider   cholecalciferol (Vitamin D3) (1000 Units /25 mcg) tablet Take 2,000 Units by mouth daily. Yes Provider, Historical   docosahexaenoic acid/epa (FISH OIL PO) Take  by mouth daily.    Yes Provider, Historical       Physical Exam:     General: well developed, well nourished   HEENT: unremarkable   Heart: regular rhythm no mumur    Lungs: clear   Abdominal:  benign   Neurological: unremarkable   Extremities: no edema     Findings/Diagnosis: Screening colonoscopy    Plan of Care/Planned Procedure: Colonoscopy with monitored anesthesia care sedation    Signed:  Joey Salgado MD 10/19/2021

## 2021-10-19 NOTE — ANESTHESIA PREPROCEDURE EVALUATION
Relevant Problems   No relevant active problems       Anesthetic History   No history of anesthetic complications            Review of Systems / Medical History  Patient summary reviewed, nursing notes reviewed and pertinent labs reviewed    Pulmonary          Smoker      Comments: Current Every Day Smoker   Neuro/Psych   Within defined limits           Cardiovascular  Within defined limits                Exercise tolerance: >4 METS     GI/Hepatic/Renal               Comments: PANCREATIC MASS Endo/Other  Within defined limits           Other Findings   Comments: Hx hyponatremia           Physical Exam    Airway  Mallampati: II  TM Distance: > 6 cm  Neck ROM: normal range of motion   Mouth opening: Normal     Cardiovascular  Regular rate and rhythm,  S1 and S2 normal,  no murmur, click, rub, or gallop             Dental    Dentition: Poor dentition  Comments:  Many missing teeth, some rotten/broken off at the gumline   Pulmonary  Breath sounds clear to auscultation               Abdominal  GI exam deferred       Other Findings            Anesthetic Plan    ASA: 2  Anesthesia type: total IV anesthesia          Induction: Intravenous  Anesthetic plan and risks discussed with: Patient

## 2021-10-19 NOTE — ANESTHESIA POSTPROCEDURE EVALUATION
Procedure(s):  COLONOSCOPY.    total IV anesthesia    Anesthesia Post Evaluation        Patient location during evaluation: PACU  Note status: Adequate. Level of consciousness: responsive to verbal stimuli and sleepy but conscious  Pain management: satisfactory to patient  Airway patency: patent  Anesthetic complications: no  Cardiovascular status: acceptable  Respiratory status: acceptable  Hydration status: acceptable  Comments: +Post-Anesthesia Evaluation and Assessment    Patient: Ailin Garrido MRN: [id number]  SSN: [id number]   YOB: 1955  Age: 73 y.o. Sex: male      Cardiovascular Function/Vital Signs    BP (!) 108/57   Pulse 74   Temp 36.6 °C (97.9 °F)   Resp 17   Ht 5' 6\" (1.676 m)   Wt 63.4 kg (139 lb 12.8 oz)   SpO2 99%   BMI 22.56 kg/m²     Patient is status post Procedure(s):  COLONOSCOPY. Nausea/Vomiting: Controlled. Postoperative hydration reviewed and adequate. Pain:  Pain Scale 1: Numeric (0 - 10) (10/19/21 1333)  Pain Intensity 1: 0 (10/19/21 1333)   Managed. Neurological Status: At baseline. Mental Status and Level of Consciousness: Arousable. Pulmonary Status:   O2 Device: Nasal cannula (10/19/21 1358)   Adequate oxygenation and airway patent. Complications related to anesthesia: None    Post-anesthesia assessment completed. No concerns.     Signed By: Jes Salguero MD    10/19/2021  Post anesthesia nausea and vomiting:  controlled      INITIAL Post-op Vital signs:   Vitals Value Taken Time   /57 10/19/21 1358   Temp     Pulse 74 10/19/21 1358   Resp 17 10/19/21 1358   SpO2 99 % 10/19/21 1358

## 2021-10-19 NOTE — PERIOP NOTES
Endoscope was pre-cleaned at bedside immediately following procedure by DIANE Kelsey     Medications     lidocaine (PF) 2% (mg)     Date/Time   Rate/Dose/Volume Action Route Admin User Audit   10/19/21  1344  100 mg Given IntraVENous Ancil Muta, CRNA              propofol 10 mg/mL (mg)     Date/Time   Rate/Dose/Volume Action Route Admin User Audit   10/19/21  1344  70 mg Given IntraVENous Ancil Muta, CRNA     1351  50 mg Given IntraVENous Ancil Muta, CRNA     1355  20 mg Given IntraVENous Ancil Muta, CRNA              ePHEDrine injection (mg) Dosing weight:  63.4    Date/Time   Rate/Dose/Volume Action Route Admin User Audit   10/19/21  1349  10 mg Given IntraVENous Ancil Muta, CRNA edited      10 mg Given IntraVENous Ancil Muta, CRNA              0.9% sodium chloride infusion (mL/hr)     Date/Time   Rate/Dose/Volume Action Route Admin User Audit   10/19/21  1340  100 mL/hr New Bag IntraVENous Idamae Curet edited    [facility] IntraVENous Ellen Smith MD     1267  623 mL/hr Rate Change IntraVENous Ancil Muta, CRNA

## 2021-10-19 NOTE — ROUTINE PROCESS
[facility]  1955  [id number]    Situation:  Verbal report received from: Kesha Burkett RN  Procedure: Procedure(s):  COLONOSCOPY    Background:    Preoperative diagnosis: PANCREATIC MASS, SCREENING  Postoperative diagnosis: Diverticulosis, Hemorrhoids    :  Dr. Amber Polanco   Assistant(s): Endoscopy Technician-1: Noel Bustillo  Endoscopy RN-1: Siddhartha Heading    Specimens: * No specimens in log *  H. Pylori  no    Assessment:  Intra-procedure medication  Anesthesia gave intra-procedure sedation and medications, see anesthesia flow sheet yes    Intravenous fluids: NS@ KVO     Vital signs stable     Abdominal assessment: round and soft     Recommendation:  Discharge patient per MD order. Family.  Cynda January wife  Permission to share finding with family or friend yes

## 2021-10-19 NOTE — DISCHARGE INSTRUCTIONS
Trish Toney MD  [facility], [address]  [place], [address] [facility]  [phone]  www. [employer] [place]  [id number]  1955    COLON DISCHARGE INSTRUCTIONS  Discomfort:  Redness at IV site- apply warm compress to area; if redness or soreness persist- contact your physician  There may be a slight amount of blood passed from the rectum  Gaseous discomfort- walking, belching will help relieve any discomfort  You may not operate a vehicle for 12 hours  You may not engage in an occupation involving machinery or appliances for rest of today  You may not drink alcoholic beverages for at least 12 hours  Avoid making any critical decisions for at least 24 hour  DIET:   High fiber diet. - however -  remember your colon is empty and a heavy meal will produce gas. Avoid these foods:  vegetables, fried / greasy foods, carbonated drinks for today      ACTIVITY:  You may resume your normal daily activities it is recommended that you spend the remainder of the day resting -  avoid any strenuous activity. CALL M.D. ANY SIGN OF:   Increasing pain, nausea, vomiting  Abdominal distension (swelling)  New increased bleeding (oral or rectal)  Fever (chills)  Pain in chest area  Bloody discharge from nose or mouth  Shortness of breath     COLONOSCOPY FINDINGS:  Your colonoscopy showed: NO polyps found. Do have diverticulosis and some internal hemorrhoids. Follow-up Instructions:   Call Dr. Trish Toney if any questions or problems. Telephone # [phone]    Should have a repeat colonoscopy in 10 years.

## 2022-03-14 ENCOUNTER — OFFICE VISIT (OUTPATIENT)
Dept: FAMILY MEDICINE CLINIC | Age: 67
End: 2022-03-14
Payer: MEDICARE

## 2022-03-14 VITALS
RESPIRATION RATE: 20 BRPM | WEIGHT: 146.6 LBS | BODY MASS INDEX: 23.56 KG/M2 | HEIGHT: 66 IN | OXYGEN SATURATION: 98 % | TEMPERATURE: 97.5 F | HEART RATE: 68 BPM | SYSTOLIC BLOOD PRESSURE: 136 MMHG | DIASTOLIC BLOOD PRESSURE: 78 MMHG

## 2022-03-14 DIAGNOSIS — Z87.891 FORMER SMOKER: ICD-10-CM

## 2022-03-14 DIAGNOSIS — Z13.220 SCREENING FOR LIPID DISORDERS: ICD-10-CM

## 2022-03-14 DIAGNOSIS — Z11.59 SCREENING FOR VIRAL DISEASE: ICD-10-CM

## 2022-03-14 DIAGNOSIS — L05.91 CHRONIC RECURRENT PILONIDAL CYST: ICD-10-CM

## 2022-03-14 DIAGNOSIS — Z12.5 SCREENING FOR MALIGNANT NEOPLASM OF PROSTATE: ICD-10-CM

## 2022-03-14 DIAGNOSIS — I10 ESSENTIAL HYPERTENSION: ICD-10-CM

## 2022-03-14 DIAGNOSIS — Z00.00 MEDICARE WELCOME EXAM: Primary | ICD-10-CM

## 2022-03-14 PROCEDURE — G0402 INITIAL PREVENTIVE EXAM: HCPCS

## 2022-03-14 PROCEDURE — G0403 EKG FOR INITIAL PREVENT EXAM: HCPCS

## 2022-03-14 PROCEDURE — 93005 ELECTROCARDIOGRAM TRACING: CPT

## 2022-03-14 PROCEDURE — 99203 OFFICE O/P NEW LOW 30 MIN: CPT

## 2022-03-14 NOTE — PATIENT INSTRUCTIONS
Medicare Wellness Visit, Male    The best way to live healthy is to have a lifestyle where you eat a well-balanced diet, exercise regularly, limit alcohol use, and quit all forms of tobacco/nicotine, if applicable. Regular preventive services are another way to keep healthy. Preventive services (vaccines, screening tests, monitoring & exams) can help personalize your care plan, which helps you manage your own care. Screening tests can find health problems at the earliest stages, when they are easiest to treat. [facility] follows the current, evidence-based guidelines published by the [facility] ([facility]) when recommending preventive services for our patients. Because we follow these guidelines, sometimes recommendations change over time as research supports it. (For example, a prostate screening blood test is no longer routinely recommended for men with no symptoms). Of course, you and your doctor may decide to screen more often for some diseases, based on your risk and co-morbidities (chronic disease you are already diagnosed with). Preventive services for you include:  - Medicare offers their members a free annual wellness visit, which is time for you and your primary care provider to discuss and plan for your preventive service needs. Take advantage of this benefit every year!  -All adults over age 72 should receive the recommended pneumonia vaccines. Current USPSTF guidelines recommend a series of two vaccines for the best pneumonia protection.   -All adults should have a flu vaccine yearly and tetanus vaccine every 10 years.  -All adults age 48 and older should receive the shingles vaccines (series of two vaccines).        -All adults age 38-68 who are overweight should have a diabetes screening test once every three years.   -Other screening tests & preventive services for persons with diabetes include: an eye exam to screen for diabetic retinopathy, a kidney function test, a foot exam, and stricter control over your cholesterol.   -Cardiovascular screening for adults with routine risk involves an electrocardiogram (ECG) at intervals determined by the provider.   -Colorectal cancer screening should be done for adults age 54-65 with no increased risk factors for colorectal cancer. There are a number of acceptable methods of screening for this type of cancer. Each test has its own benefits and drawbacks. Discuss with your provider what is most appropriate for you during your annual wellness visit. The different tests include: colonoscopy (considered the best screening method), a fecal occult blood test, a fecal DNA test, and sigmoidoscopy.  -All adults born between [facility] should be screened once for Hepatitis C.  -An Abdominal Aortic Aneurysm (AAA) Screening is recommended for men age 73-68 who has ever smoked in their lifetime.      Here is a list of your current Health Maintenance items (your personalized list of preventive services) with a due date:  Health Maintenance Due   Topic Date Due    Hepatitis C Test  Never done    COVID-19 Vaccine (1) Never done    DTaP/Tdap/Td  (1 - Tdap) Never done    Cholesterol Test   Never done    Shingles Vaccine (1 of 2) Never done    Annual Well Visit  Never done    Pneumococcal Vaccine (1 of 1 - PPSV23) Never done    Yearly Flu Vaccine (1) Never done

## 2022-03-14 NOTE — ACP (ADVANCE CARE PLANNING)
Discussed importance of advanced medical directives with patient. Patient is capable of making decisions. Discussed advanced directives 3/14/2022. [place] advance directive form discussed with pt. Pt will consider options and give us written form back for inclusion in chart.

## 2022-03-14 NOTE — PROGRESS NOTES
This is an Initial Medicare Annual Wellness Exam (AWV) (Performed 12 months after IPPE or effective date of Medicare Part B enrollment, Once in a lifetime)    I have reviewed the patient's medical history in detail and updated the computerized patient record. Assessment/Plan   Education and counseling provided:  Are appropriate based on today's review and evaluation    1. Essential hypertension  2. Screening for viral disease  3. Screening for malignant neoplasm of prostate  4. Screening for lipid disorders  5. Current smoker       Depression Risk Factor Screening     3 most recent PHQ Screens 3/14/2022   Little interest or pleasure in doing things Not at all   Feeling down, depressed, irritable, or hopeless Not at all   Total Score PHQ 2 0       Alcohol & Drug Abuse Risk Screen    Do you average more than 1 drink per night or more than 7 drinks a week: No    In the past three months have you have had more than 4 drinks containing alcohol on one occasion: No          Functional Ability and Level of Safety    Hearing: Hearing is good. Activities of Daily Living: The home contains: handrails  Patient does total self care     Ambulation: with no difficulty      Fall Risk:  Fall Risk Assessment, last 12 mths 3/14/2022   Able to walk? Yes   Fall in past 12 months? 0   Do you feel unsteady?  0   Are you worried about falling 1      Abuse Screen:  Patient is not abused       Cognitive Screening    Has your family/caregiver stated any concerns about your memory: no     Cognitive Screening: Normal - Oriented x4    Health Maintenance Due     Health Maintenance Due   Topic Date Due    Hepatitis C Screening  Never done    COVID-19 Vaccine (1) Never done    DTaP/Tdap/Td series (1 - Tdap) Never done    Lipid Screen  Never done    Shingrix Vaccine Age 50> (1 of 2) Never done    Medicare Yearly Exam  Never done    Pneumococcal 65+ years (1 of 1 - PPSV23) Never done    Flu Vaccine (1) Never done       Patient Care Team   Patient Care Team:  Anabell Merlos MD as PCP - General (Family Medicine)    History   There is no problem list on file for this patient. History reviewed. No pertinent past medical history. Past Surgical History:   Procedure Laterality Date    COLONOSCOPY N/A 10/19/2021    COLONOSCOPY performed by Rashida West MD at [facility] ENDOSCOPY    HX ORTHOPAEDIC Left 2000    ankle - with hardware    DE COLON CA SCRN NOT HI RSK IND  10/19/2021          Current Outpatient Medications   Medication Sig Dispense Refill    cholecalciferol (Vitamin D3) (1000 Units /25 mcg) tablet Take 2,000 Units by mouth daily.  docosahexaenoic acid/epa (FISH OIL PO) Take  by mouth daily. No Known Allergies    Family History   Problem Relation Age of Onset    Diabetes Mother     Cancer Father         unknown     Social History     Tobacco Use    Smoking status: Former Smoker     Packs/day: 0.25    Smokeless tobacco: Never Used   Substance Use Topics    Alcohol use: Not Currently       Trina Alex 1. Have you been to the ER, urgent care clinic since your last visit? No Hospitalized since your last visit? No    2. Have you seen or consulted any other health care providers outside of the [address] [place] [address] since your last visit? Include any pap smears or colon screening.  No

## 2022-03-14 NOTE — PROGRESS NOTES
Chief Complaint   Patient presents with    Annual Wellness Visit     Initial    Establish Care     NP       HPI:    Madalyn Colbert is a 73 y.o. male who presents as a new patient for AWV. 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 HTN:  No longer on medication; has been checking BP at home, 120-130/70s. Pancreatic cyst:  Found during an ED visit for abdominal pain in June 2021; had f/u MRI which was c/w cyst.  Recommended f/u 3 years. New issues:  Complex fx left ankle about 2 years ago; underwent ORIF and recently found that 2 of the fixation components have broken and need to be removed. He was told he couldn't have surgery unless he quit smoking, so he has done so successfully! Continues to use nicotine gum. He also notes that he has been dealing with a recurrent pilonidal cyst for 30+ years; he had it surgically drained many years ago, but it continues to periodically drain. No Known Allergies    Current Outpatient Medications   Medication Sig    cholecalciferol (Vitamin D3) (1000 Units /25 mcg) tablet Take 2,000 Units by mouth daily.  docosahexaenoic acid/epa (FISH OIL PO) Take  by mouth daily. No current facility-administered medications for this visit. History reviewed. No pertinent past medical history. Family History   Problem Relation Age of Onset    Diabetes Mother     Cancer Father         unknown       Review of Systems   Constitutional: Negative. Negative for chills, fever and malaise/fatigue. HENT: Negative. Eyes: Negative. Respiratory: Negative. Negative for cough and shortness of breath. Cardiovascular: Negative. Negative for chest pain, palpitations and leg swelling. Gastrointestinal: Negative. Negative for abdominal pain, nausea and vomiting. Genitourinary: Negative. Musculoskeletal: Positive for joint pain. Skin: Negative. Neurological: Negative. Negative for dizziness and headaches. Endo/Heme/Allergies: Negative. Psychiatric/Behavioral: Negative. Negative for depression. The patient is not nervous/anxious. /78 (BP 1 Location: Left arm, BP Patient Position: Sitting, BP Cuff Size: Adult)   Pulse 68   Temp 97.5 °F (36.4 °C) (Core)   Resp 20   Ht 5' 6.25\" (1.683 m)   Wt 146 lb 9.6 oz (66.5 kg)   SpO2 98%   BMI 23.48 kg/m²     Wt Readings from Last 3 Encounters:   03/14/22 146 lb 9.6 oz (66.5 kg)   10/19/21 139 lb 12.8 oz (63.4 kg)   08/27/21 142 lb 12.8 oz (64.8 kg)       3 most recent PHQ Screens 3/14/2022   Little interest or pleasure in doing things Not at all   Feeling down, depressed, irritable, or hopeless Not at all   Total Score PHQ 2 0       Physical Exam  Vitals and nursing note reviewed. Constitutional:       General: He is not in acute distress. Appearance: Normal appearance. He is normal weight. HENT:      Head: Normocephalic and atraumatic. Right Ear: Tympanic membrane, ear canal and external ear normal.      Left Ear: Tympanic membrane, ear canal and external ear normal.      Nose: Nose normal.      Mouth/Throat:      Mouth: Mucous membranes are moist.      Pharynx: Oropharynx is clear. Comments: edentulous  Eyes:      Extraocular Movements: Extraocular movements intact. Conjunctiva/sclera: Conjunctivae normal.      Pupils: Pupils are equal, round, and reactive to light. Neck:      Vascular: No carotid bruit. Cardiovascular:      Rate and Rhythm: Normal rate and regular rhythm. Pulses: Normal pulses. Heart sounds: Normal heart sounds. No murmur heard. No friction rub. No gallop. Pulmonary:      Effort: Pulmonary effort is normal.      Breath sounds: Normal breath sounds. No wheezing, rhonchi or rales. Abdominal:      General: Bowel sounds are normal.      Palpations: Abdomen is soft. Musculoskeletal:         General: Normal range of motion. Cervical back: Normal range of motion and neck supple. Lymphadenopathy:      Cervical: No cervical adenopathy. Skin:     General: Skin is warm and dry. Neurological:      General: No focal deficit present. Mental Status: He is alert and oriented to person, place, and time. Psychiatric:         Mood and Affect: Mood normal.         Behavior: Behavior normal.         Thought Content: Thought content normal.         Judgment: Judgment normal.       ASSESSMENT AND PLAN:       ICD-10-CM ICD-9-CM    1. Medicare welcome exam  Z00.00 V70.0 AMB POC EKG ROUTINE W/ 12 LEADS, INTER & REP   2. Essential hypertension  I10 401.9 COLLECTION VENOUS BLOOD,VENIPUNCTURE      CBC WITH AUTOMATED DIFF      METABOLIC PANEL, COMPREHENSIVE      TSH 3RD GENERATION      CBC WITH AUTOMATED DIFF      METABOLIC PANEL, COMPREHENSIVE      TSH 3RD GENERATION   3. Chronic recurrent pilonidal cyst  L05.91 685.1 REFERRAL TO GENERAL SURGERY   4. Former smoker  Z87.891 V15.82    5. Screening for viral disease  Z11.59 V73.99 COLLECTION VENOUS BLOOD,VENIPUNCTURE      HEPATITIS C AB      HEPATITIS C AB   6. Screening for malignant neoplasm of prostate  Z12.5 V76.44 COLLECTION VENOUS BLOOD,VENIPUNCTURE      PSA SCREENING (SCREENING)      PSA SCREENING (SCREENING)   7. Screening for lipid disorders  Z13.220 V77.91 COLLECTION VENOUS BLOOD,VENIPUNCTURE      LIPID PANEL      LIPID PANEL       Check labs today; Welcome to Medicare EKG. We will obtain records from his previous PCP. Normotensive today; continue to monitor. Refer to general surgery for excision of pilonidal cyst. He notes that he has gained about 15 pounds since he stopped smoking. Recommend he follow heart healthy diet, eat whole grains, lean meats, and plenty of fruits and veggies--avoiding concentrated sweets and saturated fats. Add 30 minutes of moderately intense exercise most days of the week.       Medication Side Effects and Warnings were discussed with patient:  yes  Patient Labs were reviewed:  yes  Patient Past Records were reviewed:  yes      Patient aware of plan of care and verbalized understanding. Questions answered. RTC 6 months or sooner if needed. On this date 03/14/2022 I have spent 45 minutes reviewing previous notes, test results and face to face with the patient discussing the diagnosis and importance of compliance with the treatment plan as well as documenting on the day of the visit.     Jose Mahoney NP

## 2022-03-15 ENCOUNTER — TELEPHONE (OUTPATIENT)
Dept: SURGERY | Age: 67
End: 2022-03-15

## 2022-03-15 NOTE — TELEPHONE ENCOUNTER
CALLED PT TO [facility] W/ MD. FOR A PILONIDAL CYST PT WAS REF.  BY.  Alejandra Fox

## 2022-03-16 LAB
ALBUMIN SERPL-MCNC: 4.1 G/DL (ref 3.8–4.8)
ALBUMIN/GLOB SERPL: 1.1 {RATIO} (ref 1.2–2.2)
ALP SERPL-CCNC: 92 IU/L (ref 44–121)
ALT SERPL-CCNC: 15 IU/L (ref 0–44)
AST SERPL-CCNC: 14 IU/L (ref 0–40)
BASOPHILS # BLD AUTO: 0.1 X10E3/UL (ref 0–0.2)
BASOPHILS NFR BLD AUTO: 1 %
BILIRUB SERPL-MCNC: 0.2 MG/DL (ref 0–1.2)
BUN SERPL-MCNC: 8 MG/DL (ref 8–27)
BUN/CREAT SERPL: 10 (ref 10–24)
CALCIUM SERPL-MCNC: 9.4 MG/DL (ref 8.6–10.2)
CHLORIDE SERPL-SCNC: 104 MMOL/L (ref 96–106)
CHOLEST SERPL-MCNC: 209 MG/DL (ref 100–199)
CO2 SERPL-SCNC: 22 MMOL/L (ref 20–29)
CREAT SERPL-MCNC: 0.77 MG/DL (ref 0.76–1.27)
EGFR: 99 ML/MIN/1.73
EOSINOPHIL # BLD AUTO: 0.1 X10E3/UL (ref 0–0.4)
EOSINOPHIL NFR BLD AUTO: 1 %
ERYTHROCYTE [DISTWIDTH] IN BLOOD BY AUTOMATED COUNT: 13.2 % (ref 11.6–15.4)
GLOBULIN SER CALC-MCNC: 3.7 G/DL (ref 1.5–4.5)
GLUCOSE SERPL-MCNC: 106 MG/DL (ref 65–99)
HCT VFR BLD AUTO: 39.3 % (ref 37.5–51)
HCV AB S/CO SERPL IA: <0.1 S/CO RATIO (ref 0–0.9)
HDLC SERPL-MCNC: 48 MG/DL
HGB BLD-MCNC: 12.5 G/DL (ref 13–17.7)
IMM GRANULOCYTES # BLD AUTO: 0 X10E3/UL (ref 0–0.1)
IMM GRANULOCYTES NFR BLD AUTO: 0 %
INTERPRETATION: NORMAL
LDLC SERPL CALC-MCNC: 144 MG/DL (ref 0–99)
LYMPHOCYTES # BLD AUTO: 2.4 X10E3/UL (ref 0.7–3.1)
LYMPHOCYTES NFR BLD AUTO: 37 %
MCH RBC QN AUTO: 30.1 PG (ref 26.6–33)
MCHC RBC AUTO-ENTMCNC: 31.8 G/DL (ref 31.5–35.7)
MCV RBC AUTO: 95 FL (ref 79–97)
MONOCYTES # BLD AUTO: 0.3 X10E3/UL (ref 0.1–0.9)
MONOCYTES NFR BLD AUTO: 5 %
NEUTROPHILS # BLD AUTO: 3.7 X10E3/UL (ref 1.4–7)
NEUTROPHILS NFR BLD AUTO: 56 %
PLATELET # BLD AUTO: 289 X10E3/UL (ref 150–450)
POTASSIUM SERPL-SCNC: 5.3 MMOL/L (ref 3.5–5.2)
PROT SERPL-MCNC: 7.8 G/DL (ref 6–8.5)
PSA SERPL-MCNC: 2.1 NG/ML (ref 0–4)
RBC # BLD AUTO: 4.15 X10E6/UL (ref 4.14–5.8)
SODIUM SERPL-SCNC: 142 MMOL/L (ref 134–144)
TRIGL SERPL-MCNC: 94 MG/DL (ref 0–149)
TSH SERPL DL<=0.005 MIU/L-ACNC: 0.99 UIU/ML (ref 0.45–4.5)
VLDLC SERPL CALC-MCNC: 17 MG/DL (ref 5–40)
WBC # BLD AUTO: 6.7 X10E3/UL (ref 3.4–10.8)

## 2022-03-16 NOTE — PROGRESS NOTES
Your cholesterol is high; try to follow a heart healthy diet, eat whole grains, lean meats, and plenty of fruits and veggies--avoiding concentrated sweets and saturated fats. We may need to start a medication for this if we can't get it down with diet. Your other labs look good--no concerns with your kidneys, liver, prostate, or thyroid.

## 2022-03-17 NOTE — PROGRESS NOTES
Patient verified by stating name and date of birth.  Patient informed of lab results and states understanding per [facility]

## 2023-03-27 ENCOUNTER — OFFICE VISIT (OUTPATIENT)
Dept: INTERNAL MEDICINE CLINIC | Age: 68
End: 2023-03-27
Payer: MEDICARE

## 2023-03-27 VITALS
RESPIRATION RATE: 15 BRPM | OXYGEN SATURATION: 98 % | HEIGHT: 66 IN | WEIGHT: 151.4 LBS | DIASTOLIC BLOOD PRESSURE: 84 MMHG | TEMPERATURE: 97.4 F | HEART RATE: 65 BPM | SYSTOLIC BLOOD PRESSURE: 161 MMHG | BODY MASS INDEX: 24.33 KG/M2

## 2023-03-27 DIAGNOSIS — Z72.0 TOBACCO USE: ICD-10-CM

## 2023-03-27 DIAGNOSIS — E78.5 HYPERLIPIDEMIA, UNSPECIFIED HYPERLIPIDEMIA TYPE: ICD-10-CM

## 2023-03-27 DIAGNOSIS — M79.672 LEFT FOOT PAIN: ICD-10-CM

## 2023-03-27 DIAGNOSIS — R06.02 CHRONIC SHORTNESS OF BREATH: Primary | ICD-10-CM

## 2023-03-27 DIAGNOSIS — E87.5 HYPERKALEMIA: ICD-10-CM

## 2023-03-27 DIAGNOSIS — D64.9 ANEMIA, UNSPECIFIED TYPE: ICD-10-CM

## 2023-03-27 DIAGNOSIS — K60.3 ANAL FISTULA: ICD-10-CM

## 2023-03-27 DIAGNOSIS — R73.09 ABNORMAL GLUCOSE: ICD-10-CM

## 2023-03-27 PROCEDURE — G8420 CALC BMI NORM PARAMETERS: HCPCS | Performed by: STUDENT IN AN ORGANIZED HEALTH CARE EDUCATION/TRAINING PROGRAM

## 2023-03-27 PROCEDURE — G8427 DOCREV CUR MEDS BY ELIG CLIN: HCPCS | Performed by: STUDENT IN AN ORGANIZED HEALTH CARE EDUCATION/TRAINING PROGRAM

## 2023-03-27 PROCEDURE — 3017F COLORECTAL CA SCREEN DOC REV: CPT | Performed by: STUDENT IN AN ORGANIZED HEALTH CARE EDUCATION/TRAINING PROGRAM

## 2023-03-27 PROCEDURE — G8510 SCR DEP NEG, NO PLAN REQD: HCPCS | Performed by: STUDENT IN AN ORGANIZED HEALTH CARE EDUCATION/TRAINING PROGRAM

## 2023-03-27 PROCEDURE — 1101F PT FALLS ASSESS-DOCD LE1/YR: CPT | Performed by: STUDENT IN AN ORGANIZED HEALTH CARE EDUCATION/TRAINING PROGRAM

## 2023-03-27 PROCEDURE — G8536 NO DOC ELDER MAL SCRN: HCPCS | Performed by: STUDENT IN AN ORGANIZED HEALTH CARE EDUCATION/TRAINING PROGRAM

## 2023-03-27 PROCEDURE — 99204 OFFICE O/P NEW MOD 45 MIN: CPT | Performed by: STUDENT IN AN ORGANIZED HEALTH CARE EDUCATION/TRAINING PROGRAM

## 2023-03-27 RX ORDER — DICLOFENAC SODIUM 10 MG/G
2 GEL TOPICAL 4 TIMES DAILY
Qty: 100 G | Refills: 0 | Status: SHIPPED | OUTPATIENT
Start: 2023-03-27 | End: 2023-03-29 | Stop reason: SDUPTHER

## 2023-03-27 NOTE — PROGRESS NOTES
[facility] to Those in [facility]   Internal Medicine  [address] [facility] [address], [address] [facility]  [address]  [place], [address] [facility]  [phone]      Primary Care Visit Note    Assessment/Plan:     Diagnoses and all orders for this visit:    1. Chronic shortness of breath  -     PULMONARY FUNCTION TEST; Future    2. Tobacco use  -     PNEUMOCOCCAL, PCV20, PREVNAR 20, (AGE 18 YRS+), IM, PF    3. Abnormal glucose  -     HEMOGLOBIN A1C WITH EAG; Future    4. Anemia, unspecified type  -     CBC WITH AUTOMATED DIFF; Future    5. Anal fistula  -     REFERRAL TO COLON AND RECTAL SURGERY    6. Hyperkalemia  -     METABOLIC PANEL, COMPREHENSIVE; Future    7. Hyperlipidemia, unspecified hyperlipidemia type  -     LIPID PANEL; Future    8. Left foot pain  -     diclofenac (VOLTAREN) 1 % gel; Apply 2 g to affected area four (4) times daily. Elevated blood pressure reading - follow up nurse visit check in 2 weeks. Follow up:  Pt would like to be seen every 2 months or more. Magi Ventura MD    CC:     Chief Complaint   Patient presents with    New Patient     Pt stated he will like to discuss handicap sticker not able to walk a long distance without becoming tired     Annual Wellness Visit       HPI:     Joelle Kam is a 78 y.o. male who presents for:    Establishing care. Tobacco use - he has been cutting back on tobacco use, stopped cigarettes but he is still smoking cigars intermittently. He also feels like he has been getting out of breath more easily since he got a viral infection. He has been coughing as well. This has been ongoing for months at least.     He has gained wt recently. He reports that he has a hold near his anus that drains stool. He has surgery on it in the distance past and did not cause a problem for many years but recently has started causing problems for him again. He is not sure why he was anemic but may have been due to another acute illness. ROS:   All 10 point review of systems negative except those mentioned in the HPI. Past Medical History: Active Ambulatory Problems     Diagnosis Date Noted    No Active Ambulatory Problems     Resolved Ambulatory Problems     Diagnosis Date Noted    No Resolved Ambulatory Problems     No Additional Past Medical History          Current Medications:     Current Outpatient Medications:     diclofenac (VOLTAREN) 1 % gel, Apply 2 g to affected area four (4) times daily. , Disp: 100 g, Rfl: 0    cholecalciferol (VITAMIN D3) (1000 Units /25 mcg) tablet, Take 2,000 Units by mouth daily. , Disp: , Rfl:     docosahexaenoic acid/epa (FISH OIL PO), Take  by mouth daily. , Disp: , Rfl:       Past Surgical History:     Past Surgical History:   Procedure Laterality Date    COLONOSCOPY N/A 10/19/2021    COLONOSCOPY performed by Juan C Merrill MD at [facility] ENDOSCOPY    HX ORTHOPAEDIC Left 2000    ankle - with hardware    MO COLON CA SCRN NOT HI RSK IND  10/19/2021              Family History:     Family History   Problem Relation Age of Onset    Diabetes Mother     Cancer Father         unknown         Social History:     Social History     Socioeconomic History    Marital status:      Spouse name: Not on file    Number of children: Not on file    Years of education: Not on file    Highest education level: Not on file   Occupational History    Not on file   Tobacco Use    Smoking status: Some Days     Packs/day: 0.25     Types: Cigarettes, Cigars    Smokeless tobacco: Never   Vaping Use    Vaping Use: Never used   Substance and Sexual Activity    Alcohol use: Not Currently    Drug use: Not Currently    Sexual activity: Yes   Other Topics Concern    Not on file   Social History Narrative    Not on file     Social Determinants of Health     Financial Resource Strain: Not on file   Food Insecurity: Not on file   Transportation Needs: Not on file   Physical Activity: Not on file   Stress: Not on file   Social Connections: Not on file   Intimate Partner Violence: Not on file   Housing Stability: Not on file            Visit Vitals  BP (!) 161/84   Pulse 65   Temp 97.4 °F (36.3 °C)   Resp 15   Ht 5' 6\" (1.676 m)   Wt 151 lb 6.4 oz (68.7 kg)   SpO2 98%   BMI 24.44 kg/m²       Physical Exam:   General - Well appearing  HEENT - EOMI, non-icteric sclera. Pulm - clear to auscultation bilaterally  Cardio - RRR, normal S1 S2, no murmur  Abd - soft, nontender, nabs  Extrem - no edema  Neuro-  Alert and oriented, No focal deficits      Labs/Imaging:     Lab Results   Component Value Date/Time    WBC 6.7 03/14/2022 12:00 AM    HGB 12.5 (L) 03/14/2022 12:00 AM    HCT 39.3 03/14/2022 12:00 AM    PLATELET 387 60/93/8269 12:00 AM    MCV 95 03/14/2022 12:00 AM     Lab Results   Component Value Date/Time    Sodium 142 03/14/2022 12:00 AM    Potassium 5.3 (H) 03/14/2022 12:00 AM    Chloride 104 03/14/2022 12:00 AM    CO2 22 03/14/2022 12:00 AM    Anion gap 2 (L) 06/04/2021 02:24 PM    Glucose 106 (H) 03/14/2022 12:00 AM    BUN 8 03/14/2022 12:00 AM    Creatinine 0.77 03/14/2022 12:00 AM    BUN/Creatinine ratio 10 03/14/2022 12:00 AM    GFR est AA >60 06/04/2021 02:24 PM    GFR est non-AA >60 06/04/2021 02:24 PM    Calcium 9.4 03/14/2022 12:00 AM    Bilirubin, total 0.2 03/14/2022 12:00 AM    Alk. phosphatase 92 03/14/2022 12:00 AM    Protein, total 7.8 03/14/2022 12:00 AM    Albumin 4.1 03/14/2022 12:00 AM    Globulin 5.1 (H) 06/04/2021 02:24 PM    A-G Ratio 1.1 (L) 03/14/2022 12:00 AM    ALT (SGPT) 15 03/14/2022 12:00 AM    AST (SGOT) 14 03/14/2022 12:00 AM         Please note that this dictation was completed in part with [employer], the computer voice recognition software. Quite often unanticipated grammatical, syntax, homophones, and other interpretive errors are inadvertently transcribed by the computer software. Please disregard these errors.   Please excuse any errors that have escaped final proofreading.

## 2023-03-27 NOTE — PROGRESS NOTES
Verified Name and  of the patient. Chief Complaint   Patient presents with    New Patient     Pt stated he will like to discuss handicap sticker not able to walk a long distance without becoming tired     Annual Wellness Visit       Health maintenance will be addressed with Primary Care Provider at next visit. Vitals:    23 1139 23 1144   BP: (!) 167/98 (!) 171/73   Pulse: 65    Resp: 15    Temp: 97.4 °F (36.3 °C)    SpO2: 98%    Weight: 151 lb 6.4 oz (68.7 kg)    Height: 5' 6\" (1.676 m)    PainSc:   0 - No pain        1. Have you been to the ER, urgent care clinic since your last visit? Hospitalized since your last visit? No    2. Have you seen or consulted any other health care providers outside of the [address] [place] [address] since your last visit? Include any pap smears or colon screening.  No

## 2023-03-28 ENCOUNTER — TELEPHONE (OUTPATIENT)
Dept: INTERNAL MEDICINE CLINIC | Age: 68
End: 2023-03-28

## 2023-03-28 NOTE — TELEPHONE ENCOUNTER
Patient's wife, Brandon Shaffer, states that both patient and herself need verification that they have established care with Dr. Heather Colin sent to their insurance companies. Sunil Strong that I would send a message to our  to confirm.

## 2023-03-28 NOTE — TELEPHONE ENCOUNTER
Letter has been placed in outgoing mail addressed to [facility] [address] [address], [place], [address]

## 2023-03-29 DIAGNOSIS — M79.672 LEFT FOOT PAIN: ICD-10-CM

## 2023-03-29 LAB
ALBUMIN SERPL-MCNC: 4.3 G/DL (ref 3.8–4.8)
ALBUMIN/GLOB SERPL: 1.2 {RATIO} (ref 1.2–2.2)
ALP SERPL-CCNC: 85 IU/L (ref 44–121)
ALT SERPL-CCNC: 14 IU/L (ref 0–44)
AST SERPL-CCNC: 12 IU/L (ref 0–40)
BASOPHILS # BLD AUTO: 0 X10E3/UL (ref 0–0.2)
BASOPHILS NFR BLD AUTO: 1 %
BILIRUB SERPL-MCNC: 0.3 MG/DL (ref 0–1.2)
BUN SERPL-MCNC: 10 MG/DL (ref 8–27)
BUN/CREAT SERPL: 12 (ref 10–24)
CALCIUM SERPL-MCNC: 9.8 MG/DL (ref 8.6–10.2)
CHLORIDE SERPL-SCNC: 103 MMOL/L (ref 96–106)
CHOLEST SERPL-MCNC: 243 MG/DL (ref 100–199)
CO2 SERPL-SCNC: 21 MMOL/L (ref 20–29)
CREAT SERPL-MCNC: 0.85 MG/DL (ref 0.76–1.27)
EGFRCR SERPLBLD CKD-EPI 2021: 96 ML/MIN/1.73
EOSINOPHIL # BLD AUTO: 0.2 X10E3/UL (ref 0–0.4)
EOSINOPHIL NFR BLD AUTO: 3 %
ERYTHROCYTE [DISTWIDTH] IN BLOOD BY AUTOMATED COUNT: 14.1 % (ref 11.6–15.4)
EST. AVERAGE GLUCOSE BLD GHB EST-MCNC: 131 MG/DL
GLOBULIN SER CALC-MCNC: 3.5 G/DL (ref 1.5–4.5)
GLUCOSE SERPL-MCNC: 93 MG/DL (ref 70–99)
HBA1C MFR BLD: 6.2 % (ref 4.8–5.6)
HCT VFR BLD AUTO: 39.9 % (ref 37.5–51)
HDLC SERPL-MCNC: 57 MG/DL
HGB BLD-MCNC: 13.3 G/DL (ref 13–17.7)
IMM GRANULOCYTES # BLD AUTO: 0 X10E3/UL (ref 0–0.1)
IMM GRANULOCYTES NFR BLD AUTO: 0 %
LDLC SERPL CALC-MCNC: 158 MG/DL (ref 0–99)
LYMPHOCYTES # BLD AUTO: 2.3 X10E3/UL (ref 0.7–3.1)
LYMPHOCYTES NFR BLD AUTO: 41 %
MCH RBC QN AUTO: 30 PG (ref 26.6–33)
MCHC RBC AUTO-ENTMCNC: 33.3 G/DL (ref 31.5–35.7)
MCV RBC AUTO: 90 FL (ref 79–97)
MONOCYTES # BLD AUTO: 0.5 X10E3/UL (ref 0.1–0.9)
MONOCYTES NFR BLD AUTO: 8 %
NEUTROPHILS # BLD AUTO: 2.6 X10E3/UL (ref 1.4–7)
NEUTROPHILS NFR BLD AUTO: 47 %
PLATELET # BLD AUTO: 241 X10E3/UL (ref 150–450)
POTASSIUM SERPL-SCNC: 5.3 MMOL/L (ref 3.5–5.2)
PROT SERPL-MCNC: 7.8 G/DL (ref 6–8.5)
RBC # BLD AUTO: 4.44 X10E6/UL (ref 4.14–5.8)
SODIUM SERPL-SCNC: 140 MMOL/L (ref 134–144)
TRIGL SERPL-MCNC: 158 MG/DL (ref 0–149)
VLDLC SERPL CALC-MCNC: 28 MG/DL (ref 5–40)
WBC # BLD AUTO: 5.6 X10E3/UL (ref 3.4–10.8)

## 2023-03-29 RX ORDER — DICLOFENAC SODIUM 10 MG/G
2 GEL TOPICAL 4 TIMES DAILY
Qty: 100 G | Refills: 0 | Status: SHIPPED | OUTPATIENT
Start: 2023-03-29

## 2023-03-29 NOTE — TELEPHONE ENCOUNTER
Medication from this week need to go to [employer] due to insurance. Can these go today?

## 2023-03-29 NOTE — TELEPHONE ENCOUNTER
PCP: Manasa Acharya MD    Last appt: 3/27/2023  Future Appointments   Date Time Provider Argenis Hernández   4/3/2023  2:00 PM PULMONARY LAB [facility]   5/8/2023  1:30 PM Manasa Acharya MD [facility] AMB       Requested Prescriptions     Pending Prescriptions Disp Refills    diclofenac (VOLTAREN) 1 % gel 100 g 0     Sig: Apply 2 g to affected area four (4) times daily.

## 2023-03-29 NOTE — TELEPHONE ENCOUNTER
Called and advised patient's wife, Corby Sanabria, that letters have been sent for both patient and herself.

## 2023-04-03 ENCOUNTER — HOSPITAL ENCOUNTER (OUTPATIENT)
Dept: PULMONOLOGY | Age: 68
End: 2023-04-03
Attending: STUDENT IN AN ORGANIZED HEALTH CARE EDUCATION/TRAINING PROGRAM
Payer: MEDICARE

## 2023-04-03 DIAGNOSIS — R06.02 CHRONIC SHORTNESS OF BREATH: ICD-10-CM

## 2023-04-03 PROCEDURE — 94060 EVALUATION OF WHEEZING: CPT

## 2023-04-03 PROCEDURE — 74011000250 HC RX REV CODE- 250

## 2023-04-03 PROCEDURE — 94640 AIRWAY INHALATION TREATMENT: CPT

## 2023-04-03 PROCEDURE — 94726 PLETHYSMOGRAPHY LUNG VOLUMES: CPT

## 2023-04-03 PROCEDURE — 94729 DIFFUSING CAPACITY: CPT

## 2023-04-03 RX ORDER — IPRATROPIUM BROMIDE AND ALBUTEROL SULFATE 2.5; .5 MG/3ML; MG/3ML
SOLUTION RESPIRATORY (INHALATION)
Status: COMPLETED
Start: 2023-04-03 | End: 2023-04-03

## 2023-04-03 RX ADMIN — IPRATROPIUM BROMIDE AND ALBUTEROL SULFATE: 2.5; .5 SOLUTION RESPIRATORY (INHALATION) at 14:23

## 2023-04-03 NOTE — PROGRESS NOTES
Your bad cholesterol is high which puts you at greater risk of heart attack and stroke. Continue to work to improve your diet and exercise regimen, quitting smoking and controlling your blood pressure. Additionally you would likely benefit from cholesterol lowering medication which we can discuss at your next appointment in about a month.

## 2023-04-06 ENCOUNTER — TELEPHONE (OUTPATIENT)
Dept: INTERNAL MEDICINE CLINIC | Age: 68
End: 2023-04-06

## 2023-04-06 NOTE — TELEPHONE ENCOUNTER
Diagnoses and all orders for this visit:    1. COPD, mild (HCC)  -     tiotropium (Spiriva with HandiHaler) 18 mcg inhalation capsule; Take 1 Capsule by inhalation daily.

## 2023-05-11 ENCOUNTER — OFFICE VISIT (OUTPATIENT)
Age: 68
End: 2023-05-11
Payer: MEDICARE

## 2023-05-11 VITALS
BODY MASS INDEX: 24.08 KG/M2 | WEIGHT: 153.4 LBS | OXYGEN SATURATION: 99 % | DIASTOLIC BLOOD PRESSURE: 81 MMHG | TEMPERATURE: 98.2 F | RESPIRATION RATE: 16 BRPM | HEART RATE: 62 BPM | SYSTOLIC BLOOD PRESSURE: 163 MMHG | HEIGHT: 67 IN

## 2023-05-11 DIAGNOSIS — I10 PRIMARY HYPERTENSION: ICD-10-CM

## 2023-05-11 DIAGNOSIS — J44.9 CHRONIC OBSTRUCTIVE PULMONARY DISEASE, UNSPECIFIED COPD TYPE (HCC): Primary | ICD-10-CM

## 2023-05-11 DIAGNOSIS — E78.5 HYPERLIPIDEMIA, UNSPECIFIED HYPERLIPIDEMIA TYPE: ICD-10-CM

## 2023-05-11 DIAGNOSIS — Z72.0 TOBACCO USE: ICD-10-CM

## 2023-05-11 PROCEDURE — 99214 OFFICE O/P EST MOD 30 MIN: CPT | Performed by: STUDENT IN AN ORGANIZED HEALTH CARE EDUCATION/TRAINING PROGRAM

## 2023-05-11 PROCEDURE — 3077F SYST BP >= 140 MM HG: CPT | Performed by: STUDENT IN AN ORGANIZED HEALTH CARE EDUCATION/TRAINING PROGRAM

## 2023-05-11 PROCEDURE — 1123F ACP DISCUSS/DSCN MKR DOCD: CPT | Performed by: STUDENT IN AN ORGANIZED HEALTH CARE EDUCATION/TRAINING PROGRAM

## 2023-05-11 PROCEDURE — 3079F DIAST BP 80-89 MM HG: CPT | Performed by: STUDENT IN AN ORGANIZED HEALTH CARE EDUCATION/TRAINING PROGRAM

## 2023-05-11 RX ORDER — CHLORAL HYDRATE 500 MG
CAPSULE ORAL
COMMUNITY
Start: 2020-06-04

## 2023-05-11 RX ORDER — ALBUTEROL SULFATE 90 UG/1
2 AEROSOL, METERED RESPIRATORY (INHALATION) 4 TIMES DAILY PRN
Qty: 54 G | Refills: 1 | Status: SHIPPED | OUTPATIENT
Start: 2023-05-11

## 2023-05-11 RX ORDER — TIOTROPIUM BROMIDE 18 UG/1
18 CAPSULE ORAL; RESPIRATORY (INHALATION) DAILY
Qty: 30 CAPSULE | Refills: 5 | Status: SHIPPED | OUTPATIENT
Start: 2023-05-11

## 2023-05-11 RX ORDER — ATORVASTATIN CALCIUM 10 MG/1
10 TABLET, FILM COATED ORAL DAILY
Qty: 30 TABLET | Refills: 3 | Status: SHIPPED | OUTPATIENT
Start: 2023-05-11

## 2023-05-11 SDOH — ECONOMIC STABILITY: FOOD INSECURITY: WITHIN THE PAST 12 MONTHS, YOU WORRIED THAT YOUR FOOD WOULD RUN OUT BEFORE YOU GOT MONEY TO BUY MORE.: NEVER TRUE

## 2023-05-11 SDOH — ECONOMIC STABILITY: FOOD INSECURITY: WITHIN THE PAST 12 MONTHS, THE FOOD YOU BOUGHT JUST DIDN'T LAST AND YOU DIDN'T HAVE MONEY TO GET MORE.: NEVER TRUE

## 2023-05-11 SDOH — ECONOMIC STABILITY: INCOME INSECURITY: HOW HARD IS IT FOR YOU TO PAY FOR THE VERY BASICS LIKE FOOD, HOUSING, MEDICAL CARE, AND HEATING?: VERY HARD

## 2023-05-11 SDOH — ECONOMIC STABILITY: HOUSING INSECURITY
IN THE LAST 12 MONTHS, WAS THERE A TIME WHEN YOU DID NOT HAVE A STEADY PLACE TO SLEEP OR SLEPT IN A SHELTER (INCLUDING NOW)?: YES

## 2023-05-11 ASSESSMENT — VISUAL ACUITY
OS_CC: 20/60
OD_CC: 20/60

## 2023-05-11 ASSESSMENT — PATIENT HEALTH QUESTIONNAIRE - PHQ9
SUM OF ALL RESPONSES TO PHQ QUESTIONS 1-9: 2
1. LITTLE INTEREST OR PLEASURE IN DOING THINGS: 1
SUM OF ALL RESPONSES TO PHQ QUESTIONS 1-9: 2
2. FEELING DOWN, DEPRESSED OR HOPELESS: 1
SUM OF ALL RESPONSES TO PHQ9 QUESTIONS 1 & 2: 2

## 2023-05-11 NOTE — PROGRESS NOTES
[facility] in [facility]   Internal Medicine  [address] [facility] [address], [address] [facility]  [address]  [address]  [phone]      Primary Care Visit Note    Assessment/Plan:     Chrystal Deluca was seen today for medicare awv and labs only. Diagnoses and all orders for this visit:    Chronic obstructive pulmonary disease, unspecified COPD type ([address]  -     tiotropium (SPIRIVA HANDIHALER) 18 MCG inhalation capsule; Inhale 1 capsule into the lungs daily  -     albuterol sulfate HFA (VENTOLIN HFA) 108 (90 Base) MCG/ACT inhaler; Inhale 2 puffs into the lungs 4 times daily as needed for Wheezing    Tobacco use  -     CT LUNG SCREENING; Future    Hyperlipidemia, unspecified hyperlipidemia type  -     atorvastatin (LIPITOR) 10 MG tablet; Take 1 tablet by mouth daily    Primary hypertension  -     chlorthalidone (HYGROTEN) 15 MG tablet; Take 1 tablet by mouth daily        Follow up:  No follow-up provider specified. Leroy Ortiz MD    CC:     Chief Complaint   Patient presents with    Medicare AWV    Labs Only       HPI:     Nico Higgins is a 78 y.o. male who presents for:    Patient has not been on any blood pressure medication. He states that he was running late and smoked a little of his cigar on the way here which may be increasing his BP today. Cholesterol was high at last visit as well. The 10-year ASCVD risk score (Keke JOHNSON, et al., 2019) is: 25.6%    Values used to calculate the score:      Age: 78 years      Sex: Male      Is Non- : Yes      Diabetic: No      Tobacco smoker: Yes      Systolic Blood Pressure: 517 mmHg      Is BP treated: No      HDL Cholesterol: 57 mg/dL      Total Cholesterol: 243 mg/dL      Pt has 2pm appointment with colorectal surgery. He has not been able to  his spiriva inhaler because he has been having insurance issues. He states that albuterol helps a lot though.       He is still limited in movement due

## 2023-05-11 NOTE — PATIENT INSTRUCTIONS
pedometer . Order or download the FREE \"Exercise & Physical Activity: Your Everyday Guide\" from The "[employer]. Call 5-903.697.4371 or search The [employer] online. You need 0883-0313 mg of calcium and 0505-6798 IU of vitamin D per day. It is possible to meet your calcium requirement with diet alone, but a vitamin D supplement is usually necessary to meet this goal.  When exposed to the sun, use a sunscreen that protects against both UVA and UVB radiation with an SPF of 30 or greater. Reapply every 2 to 3 hours or after sweating, drying off with a towel, or swimming. Always wear a seat belt when traveling in a car. Always wear a helmet when riding a bicycle or motorcycle.

## 2023-05-12 ENCOUNTER — TELEPHONE (OUTPATIENT)
Age: 68
End: 2023-05-12

## 2023-05-12 ENCOUNTER — CLINICAL DOCUMENTATION (OUTPATIENT)
Age: 68
End: 2023-05-12

## 2023-05-12 NOTE — TELEPHONE ENCOUNTER
Patient states he needs a call back to discuss form needed from  his appt done with Dr. 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 Patient needs to know if we have form or he needs to get from [facility] & if Dr. Thee Young can complete from his Appt yesterday. Please call to advise.  Thank you

## (undated) DEVICE — Z DISCONTINUED PER MEDLINE LINE GAS SAMPLING O2/CO2 LNG AD 13 FT NSL W/ TBNG FILTERLINE

## (undated) DEVICE — TOWEL 4 PLY TISS 19X30 SUE WHT

## (undated) DEVICE — NEEDLE HYPO 18GA L1.5IN PNK S STL HUB POLYPR SHLD REG BVL

## (undated) DEVICE — BAG SPEC BIOHZRD 10 X 10 IN --

## (undated) DEVICE — 1200 GUARD II KIT W/5MM TUBE W/O VAC TUBE: Brand: GUARDIAN

## (undated) DEVICE — Device

## (undated) DEVICE — SOLIDIFIER FLD 2OZ 1500CC N DISINF IN BTL DISP SAFESORB

## (undated) DEVICE — CONTAINER SPEC 20 ML LID NEUT BUFF FORMALIN 10 % POLYPR STS

## (undated) DEVICE — SYR 3ML LL TIP 1/10ML GRAD --

## (undated) DEVICE — Device: Brand: BALLOON3

## (undated) DEVICE — YANKAUER,TAPERED BULBOUS TIP,W/O VENT: Brand: MEDLINE

## (undated) DEVICE — CATH IV AUTOGRD BC PNK 20GA 25 -- INSYTE

## (undated) DEVICE — NEONATAL-ADULT SPO2 SENSOR: Brand: NELLCOR

## (undated) DEVICE — BASIN EMSIS 16OZ GRAPHITE PLAS KID SHP MOLD GRAD FOR ORAL

## (undated) DEVICE — SYR 10ML LUER LOK 1/5ML GRAD --

## (undated) DEVICE — SET ADMIN 16ML TBNG L100IN 2 Y INJ SITE IV PIGGY BK DISP

## (undated) DEVICE — ELECTRODE,RADIOTRANSLUCENT,FOAM,5PK: Brand: MEDLINE

## (undated) DEVICE — Device: Brand: SINGLE USE ASPIRATION NEEDLE

## (undated) DEVICE — BLOCK BITE ENDOSCP AD 21 MM W/ DIL BLU LF DISP

## (undated) DEVICE — FORCEPS BX L160CM DIA8MM GRSP DISECT CUP TIP NONLOCKING ROT

## (undated) DEVICE — KIT,1200CC RIGID CANISTER,6' AND 20" TUB: Brand: MEDLINE INDUSTRIES, INC.

## (undated) DEVICE — SOLUTION IRRIG 1000ML STRL H2O USP PLAS POUR BTL